# Patient Record
Sex: FEMALE | Race: WHITE | NOT HISPANIC OR LATINO | Employment: OTHER | URBAN - METROPOLITAN AREA
[De-identification: names, ages, dates, MRNs, and addresses within clinical notes are randomized per-mention and may not be internally consistent; named-entity substitution may affect disease eponyms.]

---

## 2021-12-27 ENCOUNTER — HOSPITAL ENCOUNTER (OUTPATIENT)
Facility: HOSPITAL | Age: 81
Setting detail: SURGERY ADMIT
End: 2021-12-27
Attending: ORTHOPAEDIC SURGERY | Admitting: ORTHOPAEDIC SURGERY

## 2022-01-04 ENCOUNTER — PRE-ADMISSION TESTING (OUTPATIENT)
Dept: PREADMISSION TESTING | Facility: HOSPITAL | Age: 82
End: 2022-01-04

## 2022-01-04 ENCOUNTER — HOSPITAL ENCOUNTER (OUTPATIENT)
Dept: GENERAL RADIOLOGY | Facility: HOSPITAL | Age: 82
Discharge: HOME | End: 2022-01-04

## 2022-01-04 VITALS
RESPIRATION RATE: 16 BRPM | HEIGHT: 61 IN | SYSTOLIC BLOOD PRESSURE: 171 MMHG | TEMPERATURE: 97.6 F | DIASTOLIC BLOOD PRESSURE: 74 MMHG | OXYGEN SATURATION: 99 % | HEART RATE: 68 BPM | BODY MASS INDEX: 23.64 KG/M2 | WEIGHT: 125.2 LBS

## 2022-01-04 LAB
ALBUMIN SERPL-MCNC: 4.6 G/DL (ref 3.5–5.2)
ALBUMIN/GLOB SERPL: 1.7 G/DL
ALP SERPL-CCNC: 50 U/L (ref 39–117)
ALT SERPL W P-5'-P-CCNC: 22 U/L (ref 1–33)
ANION GAP SERPL CALCULATED.3IONS-SCNC: 12.9 MMOL/L (ref 5–15)
APTT PPP: 26.2 SECONDS (ref 22.7–35.4)
AST SERPL-CCNC: 19 U/L (ref 1–32)
BACTERIA UR QL AUTO: ABNORMAL /HPF
BASOPHILS # BLD AUTO: 0.05 10*3/MM3 (ref 0–0.2)
BASOPHILS NFR BLD AUTO: 0.9 % (ref 0–1.5)
BILIRUB SERPL-MCNC: 0.4 MG/DL (ref 0–1.2)
BILIRUB UR QL STRIP: NEGATIVE
BUN SERPL-MCNC: 26 MG/DL (ref 8–23)
BUN/CREAT SERPL: 25.7 (ref 7–25)
CALCIUM SPEC-SCNC: 10.6 MG/DL (ref 8.6–10.5)
CHLORIDE SERPL-SCNC: 102 MMOL/L (ref 98–107)
CLARITY UR: CLEAR
CO2 SERPL-SCNC: 23.1 MMOL/L (ref 22–29)
COLOR UR: YELLOW
CREAT SERPL-MCNC: 1.01 MG/DL (ref 0.57–1)
DEPRECATED RDW RBC AUTO: 45.3 FL (ref 37–54)
EOSINOPHIL # BLD AUTO: 0.2 10*3/MM3 (ref 0–0.4)
EOSINOPHIL NFR BLD AUTO: 3.6 % (ref 0.3–6.2)
ERYTHROCYTE [DISTWIDTH] IN BLOOD BY AUTOMATED COUNT: 13.3 % (ref 12.3–15.4)
GFR SERPL CREATININE-BSD FRML MDRD: 52 ML/MIN/1.73
GLOBULIN UR ELPH-MCNC: 2.7 GM/DL
GLUCOSE SERPL-MCNC: 195 MG/DL (ref 65–99)
GLUCOSE UR STRIP-MCNC: ABNORMAL MG/DL
HBA1C MFR BLD: 9.41 % (ref 4.8–5.6)
HCT VFR BLD AUTO: 38.8 % (ref 34–46.6)
HGB BLD-MCNC: 13 G/DL (ref 12–15.9)
HGB UR QL STRIP.AUTO: NEGATIVE
HYALINE CASTS UR QL AUTO: ABNORMAL /LPF
IMM GRANULOCYTES # BLD AUTO: 0.02 10*3/MM3 (ref 0–0.05)
IMM GRANULOCYTES NFR BLD AUTO: 0.4 % (ref 0–0.5)
INR PPP: 1.17 (ref 0.9–1.1)
KETONES UR QL STRIP: NEGATIVE
LEUKOCYTE ESTERASE UR QL STRIP.AUTO: ABNORMAL
LYMPHOCYTES # BLD AUTO: 2.24 10*3/MM3 (ref 0.7–3.1)
LYMPHOCYTES NFR BLD AUTO: 40 % (ref 19.6–45.3)
MCH RBC QN AUTO: 31.3 PG (ref 26.6–33)
MCHC RBC AUTO-ENTMCNC: 33.5 G/DL (ref 31.5–35.7)
MCV RBC AUTO: 93.5 FL (ref 79–97)
MONOCYTES # BLD AUTO: 0.52 10*3/MM3 (ref 0.1–0.9)
MONOCYTES NFR BLD AUTO: 9.3 % (ref 5–12)
NEUTROPHILS NFR BLD AUTO: 2.57 10*3/MM3 (ref 1.7–7)
NEUTROPHILS NFR BLD AUTO: 45.8 % (ref 42.7–76)
NITRITE UR QL STRIP: NEGATIVE
NRBC BLD AUTO-RTO: 0 /100 WBC (ref 0–0.2)
PH UR STRIP.AUTO: <=5 [PH] (ref 5–8)
PLATELET # BLD AUTO: 178 10*3/MM3 (ref 140–450)
PMV BLD AUTO: 10 FL (ref 6–12)
POTASSIUM SERPL-SCNC: 3.9 MMOL/L (ref 3.5–5.2)
PROT SERPL-MCNC: 7.3 G/DL (ref 6–8.5)
PROT UR QL STRIP: NEGATIVE
PROTHROMBIN TIME: 14.8 SECONDS (ref 11.7–14.2)
QT INTERVAL: 402 MS
RBC # BLD AUTO: 4.15 10*6/MM3 (ref 3.77–5.28)
RBC # UR STRIP: ABNORMAL /HPF
REF LAB TEST METHOD: ABNORMAL
SODIUM SERPL-SCNC: 138 MMOL/L (ref 136–145)
SP GR UR STRIP: 1.02 (ref 1–1.03)
SQUAMOUS #/AREA URNS HPF: ABNORMAL /HPF
UROBILINOGEN UR QL STRIP: ABNORMAL
WBC # UR STRIP: ABNORMAL /HPF
WBC NRBC COR # BLD: 5.6 10*3/MM3 (ref 3.4–10.8)

## 2022-01-04 PROCEDURE — 85730 THROMBOPLASTIN TIME PARTIAL: CPT

## 2022-01-04 PROCEDURE — 73502 X-RAY EXAM HIP UNI 2-3 VIEWS: CPT

## 2022-01-04 PROCEDURE — 87086 URINE CULTURE/COLONY COUNT: CPT

## 2022-01-04 PROCEDURE — 36415 COLL VENOUS BLD VENIPUNCTURE: CPT

## 2022-01-04 PROCEDURE — 83036 HEMOGLOBIN GLYCOSYLATED A1C: CPT

## 2022-01-04 PROCEDURE — 85025 COMPLETE CBC W/AUTO DIFF WBC: CPT

## 2022-01-04 PROCEDURE — 81001 URINALYSIS AUTO W/SCOPE: CPT

## 2022-01-04 PROCEDURE — 80053 COMPREHEN METABOLIC PANEL: CPT

## 2022-01-04 PROCEDURE — 71046 X-RAY EXAM CHEST 2 VIEWS: CPT

## 2022-01-04 PROCEDURE — 93010 ELECTROCARDIOGRAM REPORT: CPT | Performed by: INTERNAL MEDICINE

## 2022-01-04 PROCEDURE — 93005 ELECTROCARDIOGRAM TRACING: CPT

## 2022-01-04 PROCEDURE — 85610 PROTHROMBIN TIME: CPT

## 2022-01-04 RX ORDER — CHLORHEXIDINE GLUCONATE 500 MG/1
CLOTH TOPICAL
COMMUNITY

## 2022-01-04 ASSESSMENT — HOOS JR
HOOS JR SCORE: 1
HOOS JR SCORE: 92.34

## 2022-01-04 NOTE — DISCHARGE INSTRUCTIONS
Take the following medications the morning of surgery: METOPROLOL, OMEPRAZOLE    ARRIVAL TIME : 900AM      General Instructions:  • Do not eat solid food after midnight the night before surgery.  • You may drink clear liquids day of surgery but must stop at least one hour before your hospital arrival time. 800AM  • It is beneficial for you to have a clear drink that contains carbohydrates the day of surgery.  We suggest a 12 to 20 ounce bottle of Gatorade or Powerade for non-diabetic patients or a 12 to 20 ounce bottle of G2 or Powerade Zero for diabetic patients. (Pediatric patients, are not advised to drink a 12 to 20 ounce carbohydrate drink)    Clear liquids are liquids you can see through.  Nothing red in color.     Plain water                               Sports drinks  Sodas                                   Gelatin (Jell-O)  Fruit juices without pulp such as white grape juice and apple juice  Popsicles that contain no fruit or yogurt  Tea or coffee (no cream or milk added)  Gatorade / Powerade  G2 / Powerade Zero    • Patients who avoid smoking, chewing tobacco and alcohol for 4 weeks prior to surgery have a reduced risk of post-operative complications.  Quit smoking as many days before surgery as you can.  • Do not smoke, use chewing tobacco or drink alcohol the day of surgery.   • Bring any papers given to you in the doctor’s office.  • Wear clean comfortable clothes.  • Do not wear contact lenses, false eyelasheS or make-up.  Bring a case for your glasses.   • Remove all piercings.  Leave jewelry and any other valuables at home.  • The Pre-Admission Testing nurse will instruct you to bring medications if unable to obtain an accurate list in Pre-Admission Testing.      Preventing a Surgical Site Infection:  • For 2 to 3 days before surgery, avoid shaving with a razor because the razor can irritate skin and make it easier to develop an infection.    • Any areas of open skin can increase the risk of a  post-operative wound infection by allowing bacteria to enter and travel throughout the body.  Notify your surgeon if you have any skin wounds / rashes even if it is not near the expected surgical site.  The area will need assessed to determine if surgery should be delayed until it is healed.  • The night prior to surgery shower using a fresh bar of anti-bacterial soap (such as Dial) and clean washcloth.  Sleep in a clean bed with clean clothing.  Do not allow pets to sleep with you.  • Shower on the morning of surgery using a fresh bar of anti-bacterial soap (such as Dial) and clean washcloth.  Dry with a clean towel and dress in clean clothing.  • Ask your surgeon if you will be receiving antibiotics prior to surgery.  • Make sure you, your family, and all healthcare providers clean their hands with soap and water or an alcohol based hand  before caring for you or your wound.    Day of surgery:  Your arrival time is approximately two hours before your scheduled surgery time.  Upon arrival, a Pre-op nurse and Anesthesiologist will review your health history, obtain vital signs, and answer questions you may have.  The only belongings needed at this time will be a list of your home medications and if applicable your C-PAP/BI-PAP machine.  A Pre-op nurse will start an IV and you may receive medication in preparation for surgery, including something to help you relax.     Please be aware that surgery does come with discomfort.  We want to make every effort to control your discomfort so please discuss any uncontrolled symptoms with your nurse.   Your doctor will most likely have prescribed pain medications.      If you are going home after surgery you will receive individualized written care instructions before being discharged.  A responsible adult must drive you to and from the hospital on the day of your surgery and stay with you for 24 hours.  Discharge prescriptions can be filled by the hospital pharmacy  during regular pharmacy hours.  If you are having surgery late in the day/evening your prescription may be e-prescribed to your pharmacy.  Please verify your pharmacy hours or chose a 24 hour pharmacy to avoid not having access to your prescription because your pharmacy has closed for the day.    If you are staying overnight following surgery, you will be transported to your hospital room following the recovery period.  [facility] has all private rooms.    If you have any questions please call Pre-Admission Testing at [phone].  Deductibles and co-payments are collected on the day of service. Please be prepared to pay the required co-pay, deductible or deposit on the day of service as defined by your plan.    Patient Education for Self-Quarantine Process    • Following your COVID testing, we strongly recommend that you wear a mask when you are with other people and practice social distancing.   • Limit your activities to only required outings.  • Wash your hands with soap and water frequently for at least 20 seconds.   • Avoid touching your eyes, nose and mouth with unwashed hands.  • Do not share anything - utensils, drinking glasses, food from the same bowl.   • Sanitize household surfaces daily. Include all high touch areas (door handles, light switches, phones, countertops, etc.)    Call your surgeon immediately if you experience any of the following symptoms:  • Sore Throat  • Shortness of Breath or difficulty breathing  • Cough  • Chills  • Body soreness or muscle pain  • Headache  • Fever  • New loss of taste or smell  • Do not arrive for your surgery ill.  Your procedure will need to be rescheduled to another time.  You will need to call your physician before the day of surgery to avoid any unnecessary exposure to hospital staff as well as other patients.      CHLORHEXIDINE CLOTH INSTRUCTIONS  The morning of surgery follow these instructions using the Chlorhexidine cloths you've been  given.  These steps reduce bacteria on the body.  Do not use the cloths near your eyes, ears mouth, genitalia or on open wounds.  Throw the cloths away after use but do not try to flush them down a toilet.      • Open and remove one cloth at a time from the package.    • Leave the cloth unfolded and begin the bathing.  • Massage the skin with the cloths using gentle pressure to remove bacteria.  Do not scrub harshly.   • Follow the steps below with one 2% CHG cloth per area (6 total cloths).  • One cloth for neck, shoulders and chest.  • One cloth for both arms, hands, fingers and underarms (do underarms last).  • One cloth for the abdomen followed by groin.  • One cloth for right leg and foot including between the toes.  • One cloth for left leg and foot including between the toes.  • The last cloth is to be used for the back of the neck, back and buttocks.    Allow the CHG to air dry 3 minutes on the skin which will give it time to work and decrease the chance of irritation.  The skin may feel sticky until it is dry.  Do not rinse with water or any other liquid or you will lose the beneficial effects of the CHG.  If mild skin irritation occurs, do rinse the skin to remove the CHG.  Report this to the nurse at time of admission.  Do not apply lotions, creams, ointments, deodorants or perfumes after using the clothes. Dress in clean clothes before coming to the hospital.    BACTROBAN NASAL OINTMENT  There are many germs normally in your nose. Bactroban is an ointment that will help reduce these germs. Please follow these instructions for Bactroban use:      __1__The day before surgery in the morning  Date__1/10/22    __2__The day before surgery in the evening              Date__1/10/22    __3__The day of surgery in the morning    Date__1/11/22    **Squirt ½ package of Bactroban Ointment onto a cotton applicator and apply to inside of 1st nostril.  Squirt the remaining Bactroban and apply to the inside of the other  nostril.

## 2022-01-05 LAB — BACTERIA SPEC AEROBE CULT: NORMAL

## 2022-03-04 ENCOUNTER — PRE-ADMISSION TESTING (OUTPATIENT)
Dept: PREADMISSION TESTING | Facility: HOSPITAL | Age: 82
End: 2022-03-04

## 2022-03-04 VITALS
HEIGHT: 61 IN | DIASTOLIC BLOOD PRESSURE: 74 MMHG | HEART RATE: 68 BPM | WEIGHT: 120.1 LBS | OXYGEN SATURATION: 98 % | RESPIRATION RATE: 16 BRPM | TEMPERATURE: 98.3 F | BODY MASS INDEX: 22.68 KG/M2 | SYSTOLIC BLOOD PRESSURE: 174 MMHG

## 2022-03-04 LAB
ALBUMIN SERPL-MCNC: 4.6 G/DL (ref 3.5–5.2)
ALBUMIN/GLOB SERPL: 1.6 G/DL
ALP SERPL-CCNC: 64 U/L (ref 39–117)
ALT SERPL W P-5'-P-CCNC: 17 U/L (ref 1–33)
ANION GAP SERPL CALCULATED.3IONS-SCNC: 12 MMOL/L (ref 5–15)
APTT PPP: 29.5 SECONDS (ref 22.7–35.4)
AST SERPL-CCNC: 19 U/L (ref 1–32)
BACTERIA UR QL AUTO: NORMAL /HPF
BASOPHILS # BLD AUTO: 0.06 10*3/MM3 (ref 0–0.2)
BASOPHILS NFR BLD AUTO: 1.1 % (ref 0–1.5)
BILIRUB SERPL-MCNC: 0.3 MG/DL (ref 0–1.2)
BILIRUB UR QL STRIP: NEGATIVE
BUN SERPL-MCNC: 29 MG/DL (ref 8–23)
BUN/CREAT SERPL: 23.8 (ref 7–25)
CALCIUM SPEC-SCNC: 10.4 MG/DL (ref 8.6–10.5)
CHLORIDE SERPL-SCNC: 104 MMOL/L (ref 98–107)
CLARITY UR: CLEAR
CO2 SERPL-SCNC: 25 MMOL/L (ref 22–29)
COD CRY URNS QL: NORMAL /HPF
COLOR UR: YELLOW
CREAT SERPL-MCNC: 1.22 MG/DL (ref 0.57–1)
DEPRECATED RDW RBC AUTO: 45.8 FL (ref 37–54)
EGFRCR SERPLBLD CKD-EPI 2021: 44.4 ML/MIN/1.73
EOSINOPHIL # BLD AUTO: 0.31 10*3/MM3 (ref 0–0.4)
EOSINOPHIL NFR BLD AUTO: 5.6 % (ref 0.3–6.2)
ERYTHROCYTE [DISTWIDTH] IN BLOOD BY AUTOMATED COUNT: 13 % (ref 12.3–15.4)
GLOBULIN UR ELPH-MCNC: 2.9 GM/DL
GLUCOSE SERPL-MCNC: 135 MG/DL (ref 65–99)
GLUCOSE UR STRIP-MCNC: ABNORMAL MG/DL
HBA1C MFR BLD: 7.1 % (ref 4.8–5.6)
HCT VFR BLD AUTO: 35.5 % (ref 34–46.6)
HGB BLD-MCNC: 11.5 G/DL (ref 12–15.9)
HGB UR QL STRIP.AUTO: NEGATIVE
HYALINE CASTS UR QL AUTO: NORMAL /LPF
IMM GRANULOCYTES # BLD AUTO: 0.01 10*3/MM3 (ref 0–0.05)
IMM GRANULOCYTES NFR BLD AUTO: 0.2 % (ref 0–0.5)
INR PPP: 1.11 (ref 0.9–1.1)
KETONES UR QL STRIP: NEGATIVE
LEUKOCYTE ESTERASE UR QL STRIP.AUTO: ABNORMAL
LYMPHOCYTES # BLD AUTO: 1.86 10*3/MM3 (ref 0.7–3.1)
LYMPHOCYTES NFR BLD AUTO: 33.9 % (ref 19.6–45.3)
MCH RBC QN AUTO: 30.8 PG (ref 26.6–33)
MCHC RBC AUTO-ENTMCNC: 32.4 G/DL (ref 31.5–35.7)
MCV RBC AUTO: 95.2 FL (ref 79–97)
MONOCYTES # BLD AUTO: 0.52 10*3/MM3 (ref 0.1–0.9)
MONOCYTES NFR BLD AUTO: 9.5 % (ref 5–12)
NEUTROPHILS NFR BLD AUTO: 2.73 10*3/MM3 (ref 1.7–7)
NEUTROPHILS NFR BLD AUTO: 49.7 % (ref 42.7–76)
NITRITE UR QL STRIP: NEGATIVE
NRBC BLD AUTO-RTO: 0 /100 WBC (ref 0–0.2)
PH UR STRIP.AUTO: <=5 [PH] (ref 5–8)
PLATELET # BLD AUTO: 185 10*3/MM3 (ref 140–450)
PMV BLD AUTO: 10.1 FL (ref 6–12)
POTASSIUM SERPL-SCNC: 3.9 MMOL/L (ref 3.5–5.2)
PROT SERPL-MCNC: 7.5 G/DL (ref 6–8.5)
PROT UR QL STRIP: NEGATIVE
PROTHROMBIN TIME: 14.2 SECONDS (ref 11.7–14.2)
RBC # BLD AUTO: 3.73 10*6/MM3 (ref 3.77–5.28)
RBC # UR STRIP: NORMAL /HPF
REF LAB TEST METHOD: NORMAL
SARS-COV-2 ORF1AB RESP QL NAA+PROBE: NOT DETECTED
SODIUM SERPL-SCNC: 141 MMOL/L (ref 136–145)
SP GR UR STRIP: 1.02 (ref 1–1.03)
SQUAMOUS #/AREA URNS HPF: NORMAL /HPF
UROBILINOGEN UR QL STRIP: ABNORMAL
WBC # UR STRIP: NORMAL /HPF
WBC NRBC COR # BLD: 5.49 10*3/MM3 (ref 3.4–10.8)

## 2022-03-04 PROCEDURE — U0005 INFEC AGEN DETEC AMPLI PROBE: HCPCS

## 2022-03-04 PROCEDURE — 85730 THROMBOPLASTIN TIME PARTIAL: CPT

## 2022-03-04 PROCEDURE — 80053 COMPREHEN METABOLIC PANEL: CPT

## 2022-03-04 PROCEDURE — 83036 HEMOGLOBIN GLYCOSYLATED A1C: CPT

## 2022-03-04 PROCEDURE — U0004 COV-19 TEST NON-CDC HGH THRU: HCPCS

## 2022-03-04 PROCEDURE — 85610 PROTHROMBIN TIME: CPT

## 2022-03-04 PROCEDURE — 81001 URINALYSIS AUTO W/SCOPE: CPT

## 2022-03-04 PROCEDURE — C9803 HOPD COVID-19 SPEC COLLECT: HCPCS

## 2022-03-04 PROCEDURE — 36415 COLL VENOUS BLD VENIPUNCTURE: CPT

## 2022-03-04 PROCEDURE — 85025 COMPLETE CBC W/AUTO DIFF WBC: CPT

## 2022-03-04 RX ORDER — DIAZEPAM 5 MG/1
5 TABLET ORAL 2 TIMES DAILY PRN
COMMUNITY

## 2022-03-04 RX ORDER — METOPROLOL SUCCINATE 100 MG/1
1 TABLET, EXTENDED RELEASE ORAL DAILY
COMMUNITY
Start: 2022-02-08

## 2022-03-04 RX ORDER — ZOLPIDEM TARTRATE 10 MG/1
1 TABLET ORAL NIGHTLY
COMMUNITY
Start: 2022-01-21

## 2022-03-04 NOTE — DISCHARGE INSTRUCTIONS
Arrive to hospital on your day of surgery at 7AM    CALL AND VERIFY ARRIVAL TIME    Take the following medications the morning of surgery:  OMEPRAZOLE, METOPROLOL      If you are on prescription narcotic pain medication to control your pain you may also take that medication the morning of surgery.    General Instructions:  • Do not eat solid food after midnight the night before surgery.  • You may drink clear liquids day of surgery but must stop at least one hour before your hospital arrival time.  • It is beneficial for you to have a clear drink that contains carbohydrates the day of surgery.  We suggest a 12 to 20 ounce bottle of Gatorade or Powerade for non-diabetic patients or a 12 to 20 ounce bottle of G2 or Powerade Zero for diabetic patients. (Pediatric patients, are not advised to drink a 12 to 20 ounce carbohydrate drink)    Clear liquids are liquids you can see through.  Nothing red in color.     Plain water                               Sports drinks  Sodas                                   Gelatin (Jell-O)  Fruit juices without pulp such as white grape juice and apple juice  Popsicles that contain no fruit or yogurt  Tea or coffee (no cream or milk added)  Gatorade / Powerade  G2 / Powerade Zero    • Infants may have breast milk up to four hours before surgery.  • Infants drinking formula may drink formula up to six hours before surgery.   • Patients who avoid smoking, chewing tobacco and alcohol for 4 weeks prior to surgery have a reduced risk of post-operative complications.  Quit smoking as many days before surgery as you can.  • Do not smoke, use chewing tobacco or drink alcohol the day of surgery.   • If applicable bring your C-PAP/ BI-PAP machine.  • Bring any papers given to you in the doctor’s office.  • Wear clean comfortable clothes.  • Do not wear contact lenses, false eyelashes or make-up.  Bring a case for your glasses.   • Bring crutches or walker if applicable.  • Remove all piercings.   Leave jewelry and any other valuables at home.  • Hair extensions with metal clips must be removed prior to surgery.  • The Pre-Admission Testing nurse will instruct you to bring medications if unable to obtain an accurate list in Pre-Admission Testing.        If you were given a blood bank ID arm band remember to bring it with you the day of surgery.    Preventing a Surgical Site Infection:  • For 2 to 3 days before surgery, avoid shaving with a razor because the razor can irritate skin and make it easier to develop an infection.    • Any areas of open skin can increase the risk of a post-operative wound infection by allowing bacteria to enter and travel throughout the body.  Notify your surgeon if you have any skin wounds / rashes even if it is not near the expected surgical site.  The area will need assessed to determine if surgery should be delayed until it is healed.  • The night prior to surgery shower using a fresh bar of anti-bacterial soap (such as Dial) and clean washcloth.  Sleep in a clean bed with clean clothing.  Do not allow pets to sleep with you.  • Shower on the morning of surgery using a fresh bar of anti-bacterial soap (such as Dial) and clean washcloth.  Dry with a clean towel and dress in clean clothing.  • Ask your surgeon if you will be receiving antibiotics prior to surgery.  • Make sure you, your family, and all healthcare providers clean their hands with soap and water or an alcohol based hand  before caring for you or your wound.    Day of surgery:  Your arrival time is approximately two hours before your scheduled surgery time.  Upon arrival, a Pre-op nurse and Anesthesiologist will review your health history, obtain vital signs, and answer questions you may have.  The only belongings needed at this time will be a list of your home medications and if applicable your C-PAP/BI-PAP machine.  A Pre-op nurse will start an IV and you may receive medication in preparation for surgery,  including something to help you relax.     Please be aware that surgery does come with discomfort.  We want to make every effort to control your discomfort so please discuss any uncontrolled symptoms with your nurse.   Your doctor will most likely have prescribed pain medications.      If you are going home after surgery you will receive individualized written care instructions before being discharged.  A responsible adult must drive you to and from the hospital on the day of your surgery and stay with you for 24 hours.  Discharge prescriptions can be filled by the hospital pharmacy during regular pharmacy hours.  If you are having surgery late in the day/evening your prescription may be e-prescribed to your pharmacy.  Please verify your pharmacy hours or chose a 24 hour pharmacy to avoid not having access to your prescription because your pharmacy has closed for the day.    If you are staying overnight following surgery, you will be transported to your hospital room following the recovery period.  [facility] has all private rooms.    If you have any questions please call Pre-Admission Testing at [phone].  Deductibles and co-payments are collected on the day of service. Please be prepared to pay the required co-pay, deductible or deposit on the day of service as defined by your plan.    Patient Education for Self-Quarantine Process    • Following your COVID testing, we strongly recommend that you wear a mask when you are with other people and practice social distancing.   • Limit your activities to only required outings.  • Wash your hands with soap and water frequently for at least 20 seconds.   • Avoid touching your eyes, nose and mouth with unwashed hands.  • Do not share anything - utensils, drinking glasses, food from the same bowl.   • Sanitize household surfaces daily. Include all high touch areas (door handles, light switches, phones, countertops, etc.)    Call your surgeon immediately  if you experience any of the following symptoms:  • Sore Throat  • Shortness of Breath or difficulty breathing  • Cough  • Chills  • Body soreness or muscle pain  • Headache  • Fever  • New loss of taste or smell  • Do not arrive for your surgery ill.  Your procedure will need to be rescheduled to another time.  You will need to call your physician before the day of surgery to avoid any unnecessary exposure to hospital staff as well as other patients  •   CHLORHEXIDINE CLOTH INSTRUCTIONS  The morning of surgery follow these instructions using the Chlorhexidine cloths you've been given.  These steps reduce bacteria on the body.  Do not use the cloths near your eyes, ears mouth, genitalia or on open wounds.  Throw the cloths away after use but do not try to flush them down a toilet.      Open and remove one cloth at a time from the package.    Leave the cloth unfolded and begin the bathing.  Massage the skin with the cloths using gentle pressure to remove bacteria.  Do not scrub harshly.   Follow the steps below with one 2% CHG cloth per area (6 total cloths).  One cloth for neck, shoulders and chest.  One cloth for both arms, hands, fingers and underarms (do underarms last).  One cloth for the abdomen followed by groin.  One cloth for right leg and foot including between the toes.  One cloth for left leg and foot including between the toes.  The last cloth is to be used for the back of the neck, back and buttocks.    Allow the CHG to air dry 3 minutes on the skin which will give it time to work and decrease the chance of irritation.  The skin may feel sticky until it is dry.  Do not rinse with water or any other liquid or you will lose the beneficial effects of the CHG.  If mild skin irritation occurs, do rinse the skin to remove the CHG.  Report this to the nurse at time of admission.  Do not apply lotions, creams, ointments, deodorants or perfumes after using the clothes. Dress in clean clothes before coming to the  hospitals.    BACTROBAN NASAL OINTMENT  There are many germs normally in your nose. Bactroban is an ointment that will help reduce these germs. Please follow these instructions for Bactroban use:      ____The day before surgery in the morning  Date________    ____The day before surgery in the evening              Date________    ____The day of surgery in the morning    Date________    **Squirt ½ package of Bactroban Ointment onto a cotton applicator and apply to inside of 1st nostril.  Squirt the remaining Bactroban and apply to the inside of the other nostril..

## 2022-03-07 ENCOUNTER — ANESTHESIA EVENT (OUTPATIENT)
Dept: PERIOP | Facility: HOSPITAL | Age: 82
End: 2022-03-07

## 2022-03-07 ENCOUNTER — HOSPITAL ENCOUNTER (INPATIENT)
Facility: HOSPITAL | Age: 82
LOS: 1 days | Discharge: HOME HEALTH | End: 2022-03-09
Attending: ORTHOPAEDIC SURGERY | Admitting: ORTHOPAEDIC SURGERY

## 2022-03-07 ENCOUNTER — APPOINTMENT (OUTPATIENT)
Dept: GENERAL RADIOLOGY | Facility: HOSPITAL | Age: 82
End: 2022-03-07

## 2022-03-07 ENCOUNTER — ANESTHESIA (OUTPATIENT)
Dept: PERIOP | Facility: HOSPITAL | Age: 82
End: 2022-03-07

## 2022-03-07 PROBLEM — Z96.649 HIP JOINT REPLACEMENT STATUS: Status: ACTIVE | Noted: 2022-03-07

## 2022-03-07 LAB
GLUCOSE BLDC GLUCOMTR-MCNC: 154 MG/DL (ref 70–130)
GLUCOSE BLDC GLUCOMTR-MCNC: 193 MG/DL (ref 70–130)
GLUCOSE BLDC GLUCOMTR-MCNC: 207 MG/DL (ref 70–130)
GLUCOSE BLDC GLUCOMTR-MCNC: 212 MG/DL (ref 70–130)

## 2022-03-07 PROCEDURE — 25010000002 EPINEPHRINE 1 MG/ML SOLUTION 30 ML VIAL: Performed by: ORTHOPAEDIC SURGERY

## 2022-03-07 PROCEDURE — 25010000002 CEFAZOLIN IN DEXTROSE 2-4 GM/100ML-% SOLUTION: Performed by: ORTHOPAEDIC SURGERY

## 2022-03-07 PROCEDURE — 25010000002 ONDANSETRON PER 1 MG: Performed by: NURSE ANESTHETIST, CERTIFIED REGISTERED

## 2022-03-07 PROCEDURE — C1889 IMPLANT/INSERT DEVICE, NOC: HCPCS | Performed by: ORTHOPAEDIC SURGERY

## 2022-03-07 PROCEDURE — G0378 HOSPITAL OBSERVATION PER HR: HCPCS

## 2022-03-07 PROCEDURE — 25010000002 ONDANSETRON PER 1 MG: Performed by: ORTHOPAEDIC SURGERY

## 2022-03-07 PROCEDURE — 97162 PT EVAL MOD COMPLEX 30 MIN: CPT

## 2022-03-07 PROCEDURE — 97530 THERAPEUTIC ACTIVITIES: CPT

## 2022-03-07 PROCEDURE — 25010000002 CLONIDINE PER 1 MG: Performed by: ORTHOPAEDIC SURGERY

## 2022-03-07 PROCEDURE — 0SRB06A REPLACEMENT OF LEFT HIP JOINT WITH OXIDIZED ZIRCONIUM ON POLYETHYLENE SYNTHETIC SUBSTITUTE, UNCEMENTED, OPEN APPROACH: ICD-10-PCS | Performed by: ORTHOPAEDIC SURGERY

## 2022-03-07 PROCEDURE — 25010000002 HYDROMORPHONE PER 4 MG: Performed by: NURSE ANESTHETIST, CERTIFIED REGISTERED

## 2022-03-07 PROCEDURE — 25010000002 FENTANYL CITRATE (PF) 50 MCG/ML SOLUTION: Performed by: NURSE ANESTHETIST, CERTIFIED REGISTERED

## 2022-03-07 PROCEDURE — C1776 JOINT DEVICE (IMPLANTABLE): HCPCS | Performed by: ORTHOPAEDIC SURGERY

## 2022-03-07 PROCEDURE — 25010000002 ROPIVACAINE PER 1 MG: Performed by: ORTHOPAEDIC SURGERY

## 2022-03-07 PROCEDURE — 25010000002 PROPOFOL 10 MG/ML EMULSION: Performed by: NURSE ANESTHETIST, CERTIFIED REGISTERED

## 2022-03-07 PROCEDURE — 25010000002 NEOSTIGMINE 5 MG/10ML SOLUTION: Performed by: NURSE ANESTHETIST, CERTIFIED REGISTERED

## 2022-03-07 PROCEDURE — 76000 FLUOROSCOPY <1 HR PHYS/QHP: CPT

## 2022-03-07 PROCEDURE — 72170 X-RAY EXAM OF PELVIS: CPT

## 2022-03-07 PROCEDURE — 82962 GLUCOSE BLOOD TEST: CPT

## 2022-03-07 PROCEDURE — 73501 X-RAY EXAM HIP UNI 1 VIEW: CPT

## 2022-03-07 PROCEDURE — 25010000002 DEXAMETHASONE PER 1 MG: Performed by: NURSE ANESTHETIST, CERTIFIED REGISTERED

## 2022-03-07 PROCEDURE — 25010000002 KETOROLAC TROMETHAMINE PER 15 MG: Performed by: ORTHOPAEDIC SURGERY

## 2022-03-07 DEVICE — KNOTLESS TISSUE CONTROL DEVICE, VIOLET UNIDIRECTIONAL (ANTIBACTERIAL) SYNTHETIC ABSORBABLE DEVICE
Type: IMPLANTABLE DEVICE | Site: HIP | Status: FUNCTIONAL
Brand: STRATAFIX

## 2022-03-07 DEVICE — OR3O DUAL MOBILITY LINER 36/48
Type: IMPLANTABLE DEVICE | Site: HIP | Status: FUNCTIONAL
Brand: OR3O DUAL MOBILITY

## 2022-03-07 DEVICE — IMPLANTABLE DEVICE: Type: IMPLANTABLE DEVICE | Site: HIP | Status: FUNCTIONAL

## 2022-03-07 DEVICE — POLARSTEM STANDARD NON-CEMENTED                                    WITH TI/HA 0
Type: IMPLANTABLE DEVICE | Site: HIP | Status: FUNCTIONAL
Brand: POLARSTEM

## 2022-03-07 DEVICE — OXINIUM FEMORAL HEAD 12/14 TAPER                                    22 MM +0
Type: IMPLANTABLE DEVICE | Site: HIP | Status: FUNCTIONAL
Brand: OXINIUM

## 2022-03-07 DEVICE — R3 3 HOLE ACETABULAR SHELL 48MM
Type: IMPLANTABLE DEVICE | Site: HIP | Status: FUNCTIONAL
Brand: R3 ACETABULAR

## 2022-03-07 DEVICE — OR3O DUAL MOBILITY XLPE INSERT 22/36
Type: IMPLANTABLE DEVICE | Site: HIP | Status: FUNCTIONAL
Brand: OR3O DUAL MOBILITY

## 2022-03-07 RX ORDER — IBUPROFEN 600 MG/1
600 TABLET ORAL ONCE AS NEEDED
Status: DISCONTINUED | OUTPATIENT
Start: 2022-03-07 | End: 2022-03-07 | Stop reason: HOSPADM

## 2022-03-07 RX ORDER — FENTANYL CITRATE 50 UG/ML
INJECTION, SOLUTION INTRAMUSCULAR; INTRAVENOUS AS NEEDED
Status: DISCONTINUED | OUTPATIENT
Start: 2022-03-07 | End: 2022-03-07 | Stop reason: SURG

## 2022-03-07 RX ORDER — CLINDAMYCIN PHOSPHATE 900 MG/50ML
900 INJECTION INTRAVENOUS ONCE
Status: COMPLETED | OUTPATIENT
Start: 2022-03-07 | End: 2022-03-07

## 2022-03-07 RX ORDER — ASPIRIN 81 MG/1
81 TABLET ORAL EVERY 12 HOURS SCHEDULED
Status: DISCONTINUED | OUTPATIENT
Start: 2022-03-07 | End: 2022-03-09 | Stop reason: HOSPADM

## 2022-03-07 RX ORDER — OXYCODONE AND ACETAMINOPHEN 7.5; 325 MG/1; MG/1
1 TABLET ORAL EVERY 4 HOURS PRN
Status: DISCONTINUED | OUTPATIENT
Start: 2022-03-07 | End: 2022-03-07 | Stop reason: HOSPADM

## 2022-03-07 RX ORDER — HYDROCODONE BITARTRATE AND ACETAMINOPHEN 7.5; 325 MG/1; MG/1
1 TABLET ORAL ONCE AS NEEDED
Status: DISCONTINUED | OUTPATIENT
Start: 2022-03-07 | End: 2022-03-07 | Stop reason: HOSPADM

## 2022-03-07 RX ORDER — DIPHENHYDRAMINE HYDROCHLORIDE 50 MG/ML
12.5 INJECTION INTRAMUSCULAR; INTRAVENOUS
Status: DISCONTINUED | OUTPATIENT
Start: 2022-03-07 | End: 2022-03-07 | Stop reason: HOSPADM

## 2022-03-07 RX ORDER — PROMETHAZINE HYDROCHLORIDE 25 MG/1
25 SUPPOSITORY RECTAL ONCE AS NEEDED
Status: COMPLETED | OUTPATIENT
Start: 2022-03-07 | End: 2022-03-07

## 2022-03-07 RX ORDER — FAMOTIDINE 20 MG/1
40 TABLET, FILM COATED ORAL DAILY
Status: DISCONTINUED | OUTPATIENT
Start: 2022-03-07 | End: 2022-03-09 | Stop reason: HOSPADM

## 2022-03-07 RX ORDER — PROMETHAZINE HYDROCHLORIDE 25 MG/1
25 TABLET ORAL ONCE AS NEEDED
Status: COMPLETED | OUTPATIENT
Start: 2022-03-07 | End: 2022-03-07

## 2022-03-07 RX ORDER — NEOSTIGMINE METHYLSULFATE 0.5 MG/ML
INJECTION, SOLUTION INTRAVENOUS AS NEEDED
Status: DISCONTINUED | OUTPATIENT
Start: 2022-03-07 | End: 2022-03-07 | Stop reason: SURG

## 2022-03-07 RX ORDER — FAMOTIDINE 10 MG/ML
20 INJECTION, SOLUTION INTRAVENOUS ONCE
Status: COMPLETED | OUTPATIENT
Start: 2022-03-07 | End: 2022-03-07

## 2022-03-07 RX ORDER — ROCURONIUM BROMIDE 10 MG/ML
INJECTION, SOLUTION INTRAVENOUS AS NEEDED
Status: DISCONTINUED | OUTPATIENT
Start: 2022-03-07 | End: 2022-03-07 | Stop reason: SURG

## 2022-03-07 RX ORDER — NICOTINE POLACRILEX 4 MG
15 LOZENGE BUCCAL
Status: DISCONTINUED | OUTPATIENT
Start: 2022-03-07 | End: 2022-03-09 | Stop reason: HOSPADM

## 2022-03-07 RX ORDER — ONDANSETRON 2 MG/ML
INJECTION INTRAMUSCULAR; INTRAVENOUS AS NEEDED
Status: DISCONTINUED | OUTPATIENT
Start: 2022-03-07 | End: 2022-03-07 | Stop reason: SURG

## 2022-03-07 RX ORDER — CEFAZOLIN SODIUM 2 G/100ML
2 INJECTION, SOLUTION INTRAVENOUS EVERY 8 HOURS
Status: COMPLETED | OUTPATIENT
Start: 2022-03-07 | End: 2022-03-08

## 2022-03-07 RX ORDER — FENTANYL CITRATE 50 UG/ML
50 INJECTION, SOLUTION INTRAMUSCULAR; INTRAVENOUS
Status: DISCONTINUED | OUTPATIENT
Start: 2022-03-07 | End: 2022-03-07 | Stop reason: HOSPADM

## 2022-03-07 RX ORDER — NALOXONE HCL 0.4 MG/ML
0.2 VIAL (ML) INJECTION AS NEEDED
Status: DISCONTINUED | OUTPATIENT
Start: 2022-03-07 | End: 2022-03-07 | Stop reason: HOSPADM

## 2022-03-07 RX ORDER — OXYCODONE HYDROCHLORIDE 5 MG/1
5 TABLET ORAL ONCE
Status: COMPLETED | OUTPATIENT
Start: 2022-03-07 | End: 2022-03-07

## 2022-03-07 RX ORDER — ACETAMINOPHEN 500 MG
1000 TABLET ORAL ONCE
Status: COMPLETED | OUTPATIENT
Start: 2022-03-07 | End: 2022-03-07

## 2022-03-07 RX ORDER — DEXAMETHASONE SODIUM PHOSPHATE 10 MG/ML
INJECTION INTRAMUSCULAR; INTRAVENOUS AS NEEDED
Status: DISCONTINUED | OUTPATIENT
Start: 2022-03-07 | End: 2022-03-07 | Stop reason: SURG

## 2022-03-07 RX ORDER — GLYCOPYRROLATE 0.2 MG/ML
INJECTION INTRAMUSCULAR; INTRAVENOUS AS NEEDED
Status: DISCONTINUED | OUTPATIENT
Start: 2022-03-07 | End: 2022-03-07 | Stop reason: SURG

## 2022-03-07 RX ORDER — LABETALOL HYDROCHLORIDE 5 MG/ML
5 INJECTION, SOLUTION INTRAVENOUS
Status: DISCONTINUED | OUTPATIENT
Start: 2022-03-07 | End: 2022-03-07 | Stop reason: HOSPADM

## 2022-03-07 RX ORDER — LIDOCAINE HYDROCHLORIDE 10 MG/ML
0.5 INJECTION, SOLUTION EPIDURAL; INFILTRATION; INTRACAUDAL; PERINEURAL ONCE AS NEEDED
Status: DISCONTINUED | OUTPATIENT
Start: 2022-03-07 | End: 2022-03-07 | Stop reason: HOSPADM

## 2022-03-07 RX ORDER — SODIUM CHLORIDE, SODIUM LACTATE, POTASSIUM CHLORIDE, CALCIUM CHLORIDE 600; 310; 30; 20 MG/100ML; MG/100ML; MG/100ML; MG/100ML
9 INJECTION, SOLUTION INTRAVENOUS CONTINUOUS
Status: DISCONTINUED | OUTPATIENT
Start: 2022-03-07 | End: 2022-03-09 | Stop reason: HOSPADM

## 2022-03-07 RX ORDER — ONDANSETRON 2 MG/ML
4 INJECTION INTRAMUSCULAR; INTRAVENOUS EVERY 6 HOURS PRN
Status: DISCONTINUED | OUTPATIENT
Start: 2022-03-07 | End: 2022-03-09 | Stop reason: HOSPADM

## 2022-03-07 RX ORDER — DIAZEPAM 5 MG/1
5 TABLET ORAL 2 TIMES DAILY PRN
Status: DISCONTINUED | OUTPATIENT
Start: 2022-03-07 | End: 2022-03-09 | Stop reason: HOSPADM

## 2022-03-07 RX ORDER — MELOXICAM 15 MG/1
15 TABLET ORAL DAILY
Status: DISCONTINUED | OUTPATIENT
Start: 2022-03-07 | End: 2022-03-09 | Stop reason: HOSPADM

## 2022-03-07 RX ORDER — PROPOFOL 10 MG/ML
VIAL (ML) INTRAVENOUS AS NEEDED
Status: DISCONTINUED | OUTPATIENT
Start: 2022-03-07 | End: 2022-03-07 | Stop reason: SURG

## 2022-03-07 RX ORDER — TRIAMTERENE AND HYDROCHLOROTHIAZIDE 37.5; 25 MG/1; MG/1
1 TABLET ORAL DAILY
Status: DISCONTINUED | OUTPATIENT
Start: 2022-03-07 | End: 2022-03-08

## 2022-03-07 RX ORDER — INSULIN LISPRO 100 [IU]/ML
0-9 INJECTION, SOLUTION INTRAVENOUS; SUBCUTANEOUS
Status: DISCONTINUED | OUTPATIENT
Start: 2022-03-07 | End: 2022-03-09 | Stop reason: HOSPADM

## 2022-03-07 RX ORDER — HYDROMORPHONE HYDROCHLORIDE 1 MG/ML
0.5 INJECTION, SOLUTION INTRAMUSCULAR; INTRAVENOUS; SUBCUTANEOUS
Status: DISCONTINUED | OUTPATIENT
Start: 2022-03-07 | End: 2022-03-07 | Stop reason: HOSPADM

## 2022-03-07 RX ORDER — CELECOXIB 200 MG/1
200 CAPSULE ORAL ONCE
Status: COMPLETED | OUTPATIENT
Start: 2022-03-07 | End: 2022-03-07

## 2022-03-07 RX ORDER — OXYCODONE HYDROCHLORIDE AND ACETAMINOPHEN 5; 325 MG/1; MG/1
2 TABLET ORAL EVERY 4 HOURS PRN
Status: DISCONTINUED | OUTPATIENT
Start: 2022-03-07 | End: 2022-03-09 | Stop reason: HOSPADM

## 2022-03-07 RX ORDER — DOCUSATE SODIUM 100 MG/1
100 CAPSULE, LIQUID FILLED ORAL 2 TIMES DAILY PRN
Status: DISCONTINUED | OUTPATIENT
Start: 2022-03-07 | End: 2022-03-09 | Stop reason: HOSPADM

## 2022-03-07 RX ORDER — ONDANSETRON 2 MG/ML
4 INJECTION INTRAMUSCULAR; INTRAVENOUS ONCE AS NEEDED
Status: COMPLETED | OUTPATIENT
Start: 2022-03-07 | End: 2022-03-07

## 2022-03-07 RX ORDER — METOPROLOL SUCCINATE 100 MG/1
100 TABLET, EXTENDED RELEASE ORAL DAILY
Status: DISCONTINUED | OUTPATIENT
Start: 2022-03-07 | End: 2022-03-09 | Stop reason: HOSPADM

## 2022-03-07 RX ORDER — OXYCODONE HYDROCHLORIDE AND ACETAMINOPHEN 5; 325 MG/1; MG/1
1 TABLET ORAL EVERY 4 HOURS PRN
Status: DISCONTINUED | OUTPATIENT
Start: 2022-03-07 | End: 2022-03-09 | Stop reason: HOSPADM

## 2022-03-07 RX ORDER — MIDAZOLAM HYDROCHLORIDE 1 MG/ML
0.5 INJECTION INTRAMUSCULAR; INTRAVENOUS
Status: DISCONTINUED | OUTPATIENT
Start: 2022-03-07 | End: 2022-03-07 | Stop reason: HOSPADM

## 2022-03-07 RX ORDER — SODIUM CHLORIDE 0.9 % (FLUSH) 0.9 %
3-10 SYRINGE (ML) INJECTION AS NEEDED
Status: DISCONTINUED | OUTPATIENT
Start: 2022-03-07 | End: 2022-03-07 | Stop reason: HOSPADM

## 2022-03-07 RX ORDER — SODIUM CHLORIDE 0.9 % (FLUSH) 0.9 %
3 SYRINGE (ML) INJECTION EVERY 12 HOURS SCHEDULED
Status: DISCONTINUED | OUTPATIENT
Start: 2022-03-07 | End: 2022-03-07 | Stop reason: HOSPADM

## 2022-03-07 RX ORDER — NALOXONE HCL 0.4 MG/ML
0.1 VIAL (ML) INJECTION
Status: DISCONTINUED | OUTPATIENT
Start: 2022-03-07 | End: 2022-03-09 | Stop reason: HOSPADM

## 2022-03-07 RX ORDER — TRANEXAMIC ACID 100 MG/ML
1000 INJECTION, SOLUTION INTRAVENOUS ONCE
Status: COMPLETED | OUTPATIENT
Start: 2022-03-07 | End: 2022-03-07

## 2022-03-07 RX ORDER — EPHEDRINE SULFATE 50 MG/ML
5 INJECTION, SOLUTION INTRAVENOUS ONCE AS NEEDED
Status: DISCONTINUED | OUTPATIENT
Start: 2022-03-07 | End: 2022-03-07 | Stop reason: HOSPADM

## 2022-03-07 RX ORDER — ONDANSETRON 4 MG/1
4 TABLET, FILM COATED ORAL EVERY 6 HOURS PRN
Status: DISCONTINUED | OUTPATIENT
Start: 2022-03-07 | End: 2022-03-09 | Stop reason: HOSPADM

## 2022-03-07 RX ORDER — DIPHENHYDRAMINE HCL 25 MG
25 CAPSULE ORAL
Status: DISCONTINUED | OUTPATIENT
Start: 2022-03-07 | End: 2022-03-07 | Stop reason: HOSPADM

## 2022-03-07 RX ORDER — DEXTROSE MONOHYDRATE 25 G/50ML
25 INJECTION, SOLUTION INTRAVENOUS
Status: DISCONTINUED | OUTPATIENT
Start: 2022-03-07 | End: 2022-03-09 | Stop reason: HOSPADM

## 2022-03-07 RX ORDER — HYDRALAZINE HYDROCHLORIDE 20 MG/ML
5 INJECTION INTRAMUSCULAR; INTRAVENOUS
Status: DISCONTINUED | OUTPATIENT
Start: 2022-03-07 | End: 2022-03-07 | Stop reason: HOSPADM

## 2022-03-07 RX ORDER — SODIUM CHLORIDE 9 MG/ML
100 INJECTION, SOLUTION INTRAVENOUS CONTINUOUS
Status: DISCONTINUED | OUTPATIENT
Start: 2022-03-07 | End: 2022-03-09

## 2022-03-07 RX ORDER — FLUMAZENIL 0.1 MG/ML
0.2 INJECTION INTRAVENOUS AS NEEDED
Status: DISCONTINUED | OUTPATIENT
Start: 2022-03-07 | End: 2022-03-07 | Stop reason: HOSPADM

## 2022-03-07 RX ORDER — ZOLPIDEM TARTRATE 5 MG/1
10 TABLET ORAL NIGHTLY
Status: DISCONTINUED | OUTPATIENT
Start: 2022-03-07 | End: 2022-03-09 | Stop reason: HOSPADM

## 2022-03-07 RX ADMIN — PROMETHAZINE HYDROCHLORIDE 25 MG: 25 SUPPOSITORY RECTAL at 12:11

## 2022-03-07 RX ADMIN — SODIUM CHLORIDE, POTASSIUM CHLORIDE, SODIUM LACTATE AND CALCIUM CHLORIDE 9 ML/HR: 600; 310; 30; 20 INJECTION, SOLUTION INTRAVENOUS at 08:23

## 2022-03-07 RX ADMIN — OXYCODONE 5 MG: 5 TABLET ORAL at 07:41

## 2022-03-07 RX ADMIN — LABETALOL HYDROCHLORIDE 5 MG: 5 INJECTION, SOLUTION INTRAVENOUS at 11:09

## 2022-03-07 RX ADMIN — MELOXICAM 15 MG: 15 TABLET ORAL at 15:20

## 2022-03-07 RX ADMIN — FAMOTIDINE 20 MG: 10 INJECTION INTRAVENOUS at 08:23

## 2022-03-07 RX ADMIN — ACETAMINOPHEN 1000 MG: 500 TABLET ORAL at 07:41

## 2022-03-07 RX ADMIN — FAMOTIDINE 40 MG: 20 TABLET, FILM COATED ORAL at 15:20

## 2022-03-07 RX ADMIN — FENTANYL CITRATE 50 MCG: 50 INJECTION INTRAMUSCULAR; INTRAVENOUS at 10:58

## 2022-03-07 RX ADMIN — FENTANYL CITRATE 50 MCG: 0.05 INJECTION, SOLUTION INTRAMUSCULAR; INTRAVENOUS at 10:13

## 2022-03-07 RX ADMIN — TRIAMTERENE AND HYDROCHLOROTHIAZIDE 1 TABLET: 37.5; 25 TABLET ORAL at 16:44

## 2022-03-07 RX ADMIN — ONDANSETRON 4 MG: 2 INJECTION INTRAMUSCULAR; INTRAVENOUS at 11:39

## 2022-03-07 RX ADMIN — HYDROMORPHONE HYDROCHLORIDE 0.5 MG: 1 INJECTION, SOLUTION INTRAMUSCULAR; INTRAVENOUS; SUBCUTANEOUS at 13:00

## 2022-03-07 RX ADMIN — TRANEXAMIC ACID 1000 MG: 1 INJECTION, SOLUTION INTRAVENOUS at 09:41

## 2022-03-07 RX ADMIN — FENTANYL CITRATE 50 MCG: 0.05 INJECTION, SOLUTION INTRAMUSCULAR; INTRAVENOUS at 09:36

## 2022-03-07 RX ADMIN — OXYCODONE AND ACETAMINOPHEN 1 TABLET: 5; 325 TABLET ORAL at 23:46

## 2022-03-07 RX ADMIN — HYDROMORPHONE HYDROCHLORIDE 0.5 MG: 1 INJECTION, SOLUTION INTRAMUSCULAR; INTRAVENOUS; SUBCUTANEOUS at 11:01

## 2022-03-07 RX ADMIN — CEFAZOLIN SODIUM 2 G: 2 INJECTION, SOLUTION INTRAVENOUS at 23:54

## 2022-03-07 RX ADMIN — CEFAZOLIN SODIUM 2 G: 2 INJECTION, SOLUTION INTRAVENOUS at 16:44

## 2022-03-07 RX ADMIN — ONDANSETRON 4 MG: 2 INJECTION INTRAMUSCULAR; INTRAVENOUS at 10:07

## 2022-03-07 RX ADMIN — CELECOXIB 200 MG: 200 CAPSULE ORAL at 07:41

## 2022-03-07 RX ADMIN — ROCURONIUM BROMIDE 35 MG: 50 INJECTION INTRAVENOUS at 09:30

## 2022-03-07 RX ADMIN — ASPIRIN 81 MG: 81 TABLET, COATED ORAL at 23:40

## 2022-03-07 RX ADMIN — NEOSTIGMINE METHYLSULFATE 3 MG: 0.5 INJECTION INTRAVENOUS at 10:26

## 2022-03-07 RX ADMIN — DEXAMETHASONE SODIUM PHOSPHATE 4 MG: 10 INJECTION INTRAMUSCULAR; INTRAVENOUS at 09:45

## 2022-03-07 RX ADMIN — TRANEXAMIC ACID 1000 MG: 1 INJECTION, SOLUTION INTRAVENOUS at 10:15

## 2022-03-07 RX ADMIN — PROPOFOL 120 MG: 10 INJECTION, EMULSION INTRAVENOUS at 09:30

## 2022-03-07 RX ADMIN — GLYCOPYRROLATE 0.4 MG: 0.2 INJECTION INTRAMUSCULAR; INTRAVENOUS at 10:26

## 2022-03-07 RX ADMIN — ONDANSETRON 4 MG: 2 INJECTION INTRAMUSCULAR; INTRAVENOUS at 15:20

## 2022-03-07 RX ADMIN — ZOLPIDEM TARTRATE 10 MG: 5 TABLET ORAL at 23:40

## 2022-03-07 RX ADMIN — HYDROMORPHONE HYDROCHLORIDE 0.5 MG: 1 INJECTION, SOLUTION INTRAMUSCULAR; INTRAVENOUS; SUBCUTANEOUS at 12:41

## 2022-03-07 RX ADMIN — CLINDAMYCIN PHOSPHATE 900 MG: 900 INJECTION, SOLUTION INTRAVENOUS at 09:21

## 2022-03-07 NOTE — THERAPY EVALUATION
"Patient Name: Corinne C Buss  : 1940    MRN: [id number]                              Today's Date: 3/7/2022       Admit Date: 3/7/2022    Visit Dx: No diagnosis found.  Patient Active Problem List   Diagnosis   • Hip joint replacement status   • Diabetes mellitus (HCC)   • Hypertension   • CKD (chronic kidney disease)     Past Medical History:   Diagnosis Date   • Arthritis    • Diabetes mellitus (HCC)    • GERD (gastroesophageal reflux disease)    • Hyperlipidemia    • Hypertension    • Left hip pain      Past Surgical History:   Procedure Laterality Date   • BREAST SURGERY      LUMPECTOMY   • COLONOSCOPY     • HYSTERECTOMY      \"MEDAL BLADDER PIECE\"      General Information     Row Name 22 1639          Physical Therapy Time and Intention    Document Type evaluation  -     Mode of Treatment individual therapy;physical therapy  -     Row Name 22 1639          General Information    Patient Profile Reviewed yes  -     Prior Level of Function independent:;gait;transfer;bed mobility  -     Existing Precautions/Restrictions fall;hip;left  -     Barriers to Rehab medically complex;cognitive status  -     Row Name 22 1639          Living Environment    People in Home spouse  -     Row Name 22 1639          Cognition    Orientation Status (Cognition) oriented to;person;verbal cues/prompts needed for orientation  Pt very sleepy - thought she was at home, reoriented to the hospital. residential through session, asking  if they had already done her hip surgery.  -     Row Name 22 1630          Safety Issues, Functional Mobility    Impairments Affecting Function (Mobility) balance;cognition;endurance/activity tolerance;strength;shortness of breath;pain;range of motion (ROM)  -     Cognitive Impairments, Mobility Safety/Performance insight into deficits/self-awareness;judgment;attention;sequencing abilities  -           User Key  (r) = Recorded By, (t) = Taken By, " (c) = Cosigned By    Initials Name Provider Type     Lora Montague Physical Therapist               Mobility     Row Name 03/07/22 1641          Bed Mobility    Bed Mobility supine-sit;sit-supine  -     Supine-Sit [place] (Bed Mobility) verbal cues;nonverbal cues (demo/gesture);1 person assist;minimum assist (75% patient effort)  -     Sit-Supine [place] (Bed Mobility) verbal cues;nonverbal cues (demo/gesture);1 person assist;minimum assist (75% patient effort)  -     Assistive Device (Bed Mobility) bed rails;head of bed elevated  -     Row Name 03/07/22 1641          Sit-Stand Transfer    Sit-Stand [place] (Transfers) minimum assist (75% patient effort);1 person assist;nonverbal cues (demo/gesture);verbal cues;moderate assist (50% patient effort)  -     Assistive Device (Sit-Stand Transfers) walker, front-wheeled  -     Comment, (Sit-Stand Transfer) Completed 2 STS with B knee buckling almost immediately - unable to attempt gait  -[facility] [place] Name 03/07/22 1641          Gait/Stairs (Locomotion)    [place] Level (Gait) unable to assess  -[facility] [place] Name 03/07/22 1641          Mobility    Extremity Weight-bearing Status left lower extremity  -     Left Lower Extremity (Weight-bearing Status) weight-bearing as tolerated (WBAT)  -           User Key  (r) = Recorded By, (t) = Taken By, (c) = Cosigned By    Initials Name Provider Type     Lora Montague Physical Therapist               Obj/Interventions     [facility] Name 03/07/22 1642          Range of Motion Comprehensive    General Range of Motion lower extremity range of motion deficits identified  -     Comment, General Range of Motion Expected post-op ROM deficits  -[facility] [place] Name 03/07/22 1642          Strength Comprehensive (MMT)    General Manual Muscle Testing (MMT) Assessment lower extremity strength deficits identified  -     Comment, General Manual Muscle Testing (MMT) Assessment Expected post-op strength deficits, BLE  grossly 3+/5  -[facility] Name 03/07/22 1642          Motor Skills    Therapeutic Exercise --  5 reps B AP/LAQ/seated marches - impaired ROM on LLE  -[facility] Name 03/07/22 1642          Balance    Balance Assessment sitting static balance;standing static balance  -     Static Sitting Balance minimal assist;moderate assist  -     Position, Sitting Balance supported;sitting edge of bed  -     Static Standing Balance minimal assist;moderate assist  -     Position/Device Used, Standing Balance walker, front-wheeled  -     Comment, Balance Pt very lethargic, intermittently falling asleep while sitting EOB and falling forward requiring min-mod A to correct/maintain sitting balance. With B knee buckling in standing requiring min-mod A to maintain standing balance and assist back to bed.  -[facility] Name 03/07/22 1642          Sensory Assessment (Somatosensory)    Sensory Assessment (Somatosensory) sensation intact  -           User Key  (r) = Recorded By, (t) = Taken By, (c) = Cosigned By    Initials Name Provider Type     Lora Montague Physical Therapist               Goals/Plan     [facility] Name 03/07/22 1651          Bed Mobility Goal 1 (PT)    Activity/Assistive Device (Bed Mobility Goal 1, PT) bed mobility activities, all  -     [place] Level/Cues Needed (Bed Mobility Goal 1, PT) standby assist  -     Time Frame (Bed Mobility Goal 1, PT) 1 week  -[facility] Name 03/07/22 1651          Transfer Goal 1 (PT)    Activity/Assistive Device (Transfer Goal 1, PT) transfers, all  -     [place] Level/Cues Needed (Transfer Goal 1, PT) contact guard required  -     Time Frame (Transfer Goal 1, PT) 1 week  -[facility] Name 03/07/22 1651          Gait Training Goal 1 (PT)    Activity/Assistive Device (Gait Training Goal 1, PT) gait (walking locomotion)  -     [place] Level (Gait Training Goal 1, PT) contact guard required  -     Distance (Gait Training Goal 1, PT) 100ft  -     Time Frame  (Gait Training Goal 1, PT) 1 week  -           User Key  (r) = Recorded By, (t) = Taken By, (c) = Cosigned By    Initials Name Provider Type     Lora Montague Physical Therapist               Clinical Impression     Row Name 03/07/22 6728          Pain    Pretreatment Pain Rating 0/10 - no pain  -     Posttreatment Pain Rating 0/10 - no pain  -     Row Name 03/07/22 2133          Plan of Care Review    Plan of Care Reviewed With patient;spouse  -     Outcome Evaluation Pt is an 80 yo F POD 0 L MEG. Pt lives with her spouse who was present at beginning of eval and reports he is able to help her at home. PLOF unclear and pt unable to answer questions as she repeatedly kept falling asleep. Pt presents to PT with impaired strength, endurance, and limited overall mobility. Pt required min A x1 for bed mobility and min-mod A for STS using rwx. Pt requiring min-mod A throughout session for both sitting and standing balance - pt falling asleep sitting EOB but reporting she wanted to try to use BSC. Attempted standing 2x - both with immediate B knee buckling and requiring assist back to bed - unsafe to attempt transfers or gait this PM. Pt assisted with return to supine and unable to stay awake to participate in LE exercises. Pt will continue to benefit from skilled PT to address functional deficits and improve overall independent mobility. PT recommending D/C home with HHPT vs SNF pending progress.  -     Row Name 03/07/22 4055          Therapy Assessment/Plan (PT)    Patient/Family Therapy Goals Statement (PT) Return to PLOF  -     Rehab Potential (PT) good, to achieve stated therapy goals  -     Criteria for Skilled Interventions Met (PT) yes  -     Row Name 03/07/22 8034          Positioning and Restraints    Pre-Treatment Position in bed  -     Post Treatment Position bed  -     In Bed supine;call light within reach;encouraged to call for assist;exit alarm on  -           User Key  (r) =  Recorded By, (t) = Taken By, (c) = Cosigned By    Initials Name Provider Type     Lora Montague Physical Therapist               Outcome Measures     Row Name 03/07/22 1652          How much help from another person do you currently need...    Turning from your back to your side while in flat bed without using bedrails? 2  -BH     Moving from lying on back to sitting on the side of a flat bed without bedrails? 2  -BH     Moving to and from a bed to a chair (including a wheelchair)? 2  -BH     Standing up from a chair using your arms (e.g., wheelchair, bedside chair)? 2  -BH     Climbing 3-5 steps with a railing? 1  -BH     To walk in hospital room? 1  -     AM-PAC 6 Clicks Score (PT) 10  -     Row Name 03/07/22 1652          Functional Assessment    Outcome Measure Options AM-PAC 6 Clicks Basic Mobility (PT)  -           User Key  (r) = Recorded By, (t) = Taken By, (c) = Cosigned By    Initials Name Provider Type     Lora Montague Physical Therapist                             Physical Therapy Education                 Title: PT OT SLP Therapies (Done)     Topic: Physical Therapy (Done)     Point: Mobility training (Done)     Learning Progress Summary           Patient Acceptance, E,TB,D, VU,NR by  at 3/7/2022 1652                   Point: Home exercise program (Done)     Learning Progress Summary           Patient Acceptance, E,TB,D, VU,NR by  at 3/7/2022 1652                   Point: Body mechanics (Done)     Learning Progress Summary           Patient Acceptance, E,TB,D, VU,NR by  at 3/7/2022 1652                   Point: Precautions (Done)     Learning Progress Summary           Patient Acceptance, E,TB,D, VU,NR by  at 3/7/2022 1652                               User Key     Initials Effective Dates Name Provider Type [facility] 05/10/21 -  Lora Montague Physical Therapist PT              PT Recommendation and Plan  Planned Therapy Interventions (PT): balance training, bed mobility  training, gait training, home exercise program, patient/family education, strengthening, stair training, transfer training  Plan of Care Reviewed With: patient, spouse  Outcome Evaluation: Pt is an 82 yo F POD 0 L MEG. Pt lives with her spouse who was present at beginning of eval and reports he is able to help her at home. PLOF unclear and pt unable to answer questions as she repeatedly kept falling asleep. Pt presents to PT with impaired strength, endurance, and limited overall mobility. Pt required min A x1 for bed mobility and min-mod A for STS using rwx. Pt requiring min-mod A throughout session for both sitting and standing balance - pt falling asleep sitting EOB but reporting she wanted to try to use BSC. Attempted standing 2x - both with immediate B knee buckling and requiring assist back to bed - unsafe to attempt transfers or gait this PM. Pt assisted with return to supine and unable to stay awake to participate in LE exercises. Pt will continue to benefit from skilled PT to address functional deficits and improve overall independent mobility. PT recommending D/C home with HHPT vs SNF pending progress.     Time Calculation:    PT Charges     Row Name 03/07/22 1653             Time Calculation    Start Time 1504  -      Stop Time 1525  -      Time Calculation (min) 21 min  -BH      PT Received On 03/07/22  -      PT - Next Appointment 03/08/22  -      PT Goal Re-Cert Due Date 03/14/22  -              Time Calculation- PT    Total Timed Code Minutes- PT 18 minute(s)  -              Timed Charges    62943 - PT Therapeutic Activity Minutes 18  -BH              Untimed Charges    PT Eval/Re-eval Minutes 5  -BH              Total Minutes    Timed Charges Total Minutes 18  -BH      Untimed Charges Total Minutes 5  -BH       Total Minutes 23  -BH            User Key  (r) = Recorded By, (t) = Taken By, (c) = Cosigned By    Initials Name Provider Type    Lora Walsh Physical Therapist               Therapy Charges for Today     Code Description Service Date Service Provider Modifiers Qty    55077877479 HC PT THERAPEUTIC ACT EA 15 MIN 3/7/2022 Lora Montague GP 1    53992853809 HC PT EVAL MOD COMPLEXITY 2 3/7/2022 Lora Montague GP 1          PT G-Codes  Outcome Measure Options: AM-PAC 6 Clicks Basic Mobility (PT)  AM-PAC 6 Clicks Score (PT): 10    LORA MONTAGUE  3/7/2022

## 2022-03-07 NOTE — ANESTHESIA PREPROCEDURE EVALUATION
Anesthesia Evaluation     Patient summary reviewed and Nursing notes reviewed                Airway   Mallampati: II  TM distance: >3 FB  Neck ROM: limited  Dental      Pulmonary - negative pulmonary ROS   Cardiovascular     ECG reviewed  Patient on routine beta blocker and Beta blocker given within 24 hours of surgery  Rhythm: regular  Rate: normal    (+) hypertension, hyperlipidemia,       Neuro/Psych- negative ROS  GI/Hepatic/Renal/Endo    (+)  GERD,  diabetes mellitus type 2,     Musculoskeletal     Abdominal    Substance History - negative use     OB/GYN negative ob/gyn ROS         Other   arthritis,                      Anesthesia Plan    ASA 3     general   (I have reviewed the patient's history with the patient and the chart, including all pertinent laboratory results and imaging. I have explained the risks of anesthesia including but not limited to dental damage, corneal abrasion, nerve injury, MI, stroke, and death. Questions asked and answered. Anesthetic plan discussed with patient and team as indicated. Patient expressed understanding of the above.  )  intravenous induction     Anesthetic plan, all risks, benefits, and alternatives have been provided, discussed and informed consent has been obtained with: patient.        CODE STATUS:

## 2022-03-07 NOTE — CONSULTS
"Internal Medicine Consult  INTERNAL MEDICINE   [facility]       Patient Identification:  Name: Corinne C Buss  Age: 81 y.o.  Sex: female  :  1940  MRN: [id number]                   Primary Care Physician: Shawn Perdue MD                               Requesting physician: Dr. Jasvir Horton  Date of consultation: 3/7/2022    Reason for consultation: Management of diabetes and hypertension in a patient who is status post left anterior total hip arthroplasty.    History of Present Illness:   Patient is a 81-year-old female with past medical history as noted below has very poorly controlled diabetes with last hemoglobin A1c that she recalls was 9 in January of this year had made great strides after meeting endocrinologist in terms of diet adjustments has been better with latest hemoglobin A1c being 7 has been battling with left hip osteoarthritis for which she was seen by orthopedic surgery service and was recommended to have hip replacement as she failed conservative management to control her pain and limitation is osteoarthritis most important in her life.  According to the patient when she came in for surgery she was feeling otherwise fine and denied any chest pain shortness of breath nausea vomiting diarrhea or decrease in appetite and the main issues he was having was discomfort in her hip.  Since surgery she has expected discomfort in the left hip but denies any other symptoms.      Past Medical History:  Past Medical History:   Diagnosis Date   • Arthritis    • Diabetes mellitus (HCC)    • GERD (gastroesophageal reflux disease)    • Hyperlipidemia    • Hypertension    • Left hip pain      Past Surgical History:  Past Surgical History:   Procedure Laterality Date   • BREAST SURGERY      LUMPECTOMY   • COLONOSCOPY     • HYSTERECTOMY      \"MEDAL BLADDER PIECE\"      Home Meds:  Medications Prior to Admission   Medication Sig Dispense Refill Last Dose   • Chlorhexidine Gluconate Cloth 2 " % pads Apply  topically. AS DIRECTED   3/7/2022 at 0500   • diazePAM (VALIUM) 5 MG tablet Take 5 mg by mouth 2 (Two) Times a Day As Needed for Anxiety.   Past Week at Unknown time   • fenofibrate 160 MG tablet Take 160 mg by mouth daily.   3/6/2022 at 0900   • glipizide (GLUCOTROL) 5 MG tablet Take 5 mg by mouth Daily.   3/6/2022 at 0900   • Janumet -1000 MG tablet Take 1 tablet by mouth Daily.   3/6/2022 at 0900   • metoprolol succinate XL (TOPROL-XL) 100 MG 24 hr tablet Take 1 tablet by mouth Daily.   3/7/2022 at 0500   • mupirocin (BACTROBAN) 2 % nasal ointment into the nostril(s) as directed by provider 2 (Two) Times a Day. AS DIRECTED   3/7/2022 at 0500   • omeprazole (PriLOSEC) 20 MG capsule Take 20 mg by mouth daily.   3/7/2022 at 0500   • rosuvastatin (CRESTOR) 5 MG tablet Take 5 mg by mouth daily.   3/6/2022 at 0900   • triamterene-hydrochlorothiazide (MAXZIDE-25) 37.5-25 MG per tablet Take 1 tablet by mouth daily.   3/6/2022 at 0900   • zolpidem (AMBIEN) 10 MG tablet Take 1 tablet by mouth Every Night.   Past Week at Unknown time   • Dulaglutide (TRULICITY SC) Inject 1 ampule under the skin into the appropriate area as directed Daily. MONDAYS 2/28/2022   • meloxicam (MOBIC) 15 MG tablet Take 15 mg by mouth Daily. HOLD X 1 WEEK PRIOR TO OR   2/28/2022     Current Meds:     Current Facility-Administered Medications:   •  aspirin EC tablet 81 mg, 81 mg, Oral, Q12H, Jasvir Horton II, MD  •  ceFAZolin in dextrose (ANCEF) IVPB solution 2 g, 2 g, Intravenous, Q8H, Jasvir Horton II, MD  •  diazePAM (VALIUM) tablet 5 mg, 5 mg, Oral, BID PRN, Jasvir Horton II, MD  •  docusate sodium (COLACE) capsule 100 mg, 100 mg, Oral, BID PRN, Jasvir Horton II, MD  •  famotidine (PEPCID) tablet 40 mg, 40 mg, Oral, Daily, Jasvir Horton II, MD, 40 mg at 03/07/22 1520  •  HYDROmorphone (DILAUDID) injection 1 mg, 1 mg, Intramuscular, Q4H PRN **AND** naloxone (NARCAN)  injection 0.1 mg, 0.1 mg, Intravenous, Q5 Min PRN, Jasvir Horton II, MD  •  lactated ringers infusion, 9 mL/hr, Intravenous, Continuous, Delgado Cope MD, Last Rate: 9 mL/hr at 03/07/22 0930, Rate Change at 03/07/22 0930  •  meloxicam (MOBIC) tablet 15 mg, 15 mg, Oral, Daily, Jasvir Horton II, MD, 15 mg at 03/07/22 1520  •  metoprolol succinate XL (TOPROL-XL) 24 hr tablet 100 mg, 100 mg, Oral, Daily, Jasvir Horton II, MD  •  ondansetron (ZOFRAN) tablet 4 mg, 4 mg, Oral, Q6H PRN **OR** ondansetron (ZOFRAN) injection 4 mg, 4 mg, Intravenous, Q6H PRN, Jasvir Horton II, MD, 4 mg at 03/07/22 1520  •  oxyCODONE-acetaminophen (PERCOCET) 5-325 MG per tablet 1 tablet, 1 tablet, Oral, Q4H PRN, Jasvir Horton II, MD  •  oxyCODONE-acetaminophen (PERCOCET) 5-325 MG per tablet 2 tablet, 2 tablet, Oral, Q4H PRN, Jasvir Horton II, MD  •  sodium chloride 0.9 % infusion, 100 mL/hr, Intravenous, Continuous, Jasvir Horton II, MD  •  triamterene-hydrochlorothiazide (MAXZIDE-25) 37.5-25 MG per tablet 1 tablet, 1 tablet, Oral, Daily, Jasvir Horton II, MD  •  zolpidem (AMBIEN) tablet 10 mg, 10 mg, Oral, Nightly, Jasvir Horton II, MD  Allergies:  Allergies   Allergen Reactions   • Cefdinir GI Intolerance     Social History:   Social History     Tobacco Use   • Smoking status: Never Smoker   • Smokeless tobacco: Never Used   Substance Use Topics   • Alcohol use: No      Family History:  Family History   Problem Relation Age of Onset   • COPD Mother    • No Known Problems Father    • Malig Hyperthermia Neg Hx           Review of Systems  See history of present illness and past medical history.    Constitutional: Remarkable for no fever or chills  Cardiovascular: No chest pain or shortness of breath  GI: Remarkable for no nausea vomiting or diarrhea  : No burning urination frequency urgency  Musculoskeletal: Left hip discomfort and  "osteoarthritis for which she had surgery earlier today  Neurological: Remarkable for no loss of consciousness or continence  Endocrine recent remarkable for poorly controlled diabetes with latest hemoglobin A1c being 7 down from 9 couple of months ago.  Remainder of ROS is negative.      Vitals:   /63 (BP Location: Left arm, Patient Position: Lying)   Pulse 65   Temp 96.9 °F (36.1 °C) (Oral)   Resp 16   Ht 154.9 cm (61\")   Wt 53.8 kg (118 lb 8 oz)   SpO2 99%   BMI 22.39 kg/m²   I/O:     Intake/Output Summary (Last 24 hours) at 3/7/2022 1621  Last data filed at 3/7/2022 1319  Gross per 24 hour   Intake 1800 ml   Output 150 ml   Net 1650 ml     Exam:  Patient is examined using the personal protective equipment as per guidelines from infection control for this particular patient as enacted.  Hand washing was performed before and after patient interaction.  General Appearance:    Alert, cooperative, no distress, appears stated age   Head:    Normocephalic, without obvious abnormality, atraumatic   Eyes:    PERRL, conjunctiva/corneas clear, EOM's intact, both eyes   Ears:    Normal external ear canals, both ears   Nose:   Nares normal, septum midline, mucosa normal, no drainage    or sinus tenderness   Throat:   Lips, tongue, gums normal; oral mucosa pink and moist   Neck:   Supple, symmetrical, trachea midline, no adenopathy;     thyroid:  no enlargement/tenderness/nodules; no carotid    bruit or JVD   Back:     Symmetric, no curvature, ROM normal, no CVA tenderness   Lungs:     Clear to auscultation bilaterally, respirations unlabored   Chest Wall:    No tenderness or deformity    Heart:    Regular rate and rhythm, S1 and S2 normal, no murmur, rub   or gallop   Abdomen:     Soft, non-tender, bowel sounds active all four quadrants,     no masses, no hepatomegaly, no splenomegaly   Extremities:  Elective surgical site dressed   Pulses:   Pulses palpable in all extremities; symmetric all extremities   Skin:   " Skin color normal, Skin is warm and dry,  no rashes or palpable lesions   Neurologic:  Grossly nonfocal       Data Review:      I reviewed the patient's new clinical results.  Results from last 7 days   Lab Units 03/04/22  1217   WBC 10*3/mm3 5.49   HEMOGLOBIN g/dL 11.5*   PLATELETS 10*3/mm3 185     Results from last 7 days   Lab Units 03/04/22  1217   SODIUM mmol/L 141   POTASSIUM mmol/L 3.9   CHLORIDE mmol/L 104   CO2 mmol/L 25.0   BUN mg/dL 29*   CREATININE mg/dL 1.22*   CALCIUM mg/dL 10.4   GLUCOSE mg/dL 135*     XR Pelvis 1 or 2 View    Result Date: 3/7/2022  Left hip arthroplasty as expected.  This report was finalized on 3/7/2022 11:17 AM by Dr. Rj Reynolds M.D.      XR Hip With or Without Pelvis 1 View Left    Result Date: 3/7/2022  Operative imaging was provided for Dr. Horton during left hip arthroplasty.  This report was finalized on 3/7/2022 11:01 AM by Dr. Rj Reynolds M.D.        Assessment:  Active Hospital Problems    Diagnosis  POA   • **Hip joint replacement status [Z96.649]  Not Applicable   • Diabetes mellitus (HCC) [E11.9]  Unknown   • Hypertension [I10]  Unknown   • CKD (chronic kidney disease) [N18.9]  Unknown       Plan: See admitting orders  · Management of pain, DVT prophylaxis and activity guidelines and eventual discharge disposition per primary orthopedic surgery service.  · Monitor her blood pressure and continue her home blood pressure medications with plans to hold antihypertensive if her systolic blood pressures 100 or less.  In that scenario provide with IV fluids.  · Monitor renal function and avoid nephrotoxic agents and hypotensive episodes.  · Once her appetite is established allow her to continue her home regimen of glipizide and Janumet with Accu-Cheks moderate dose sliding scale coverage.  And watch for hypoglycemia.  Lewis Quiñones MD   3/7/2022  16:21 EST  Much of this encounter note is an electronic transcription/translation of spoken language to printed text. The  electronic translation of spoken language may permit erroneous, or at times, nonsensical words or phrases to be inadvertently transcribed; Although I have reviewed the note for such errors, some may still exist

## 2022-03-07 NOTE — PLAN OF CARE
Goal Outcome Evaluation:  Plan of Care Reviewed With: patient, spouse           Outcome Evaluation: Pt is an 80 yo F POD 0 L MEG. Pt lives with her spouse who was present at beginning of eval and reports he is able to help her at home. PLOF unclear and pt unable to answer questions as she repeatedly kept falling asleep. Pt presents to PT with impaired strength, endurance, and limited overall mobility. Pt required min A x1 for bed mobility and min-mod A for STS using rwx. Pt requiring min-mod A throughout session for both sitting and standing balance - pt falling asleep sitting EOB but reporting she wanted to try to use BSC. Attempted standing 2x - both with immediate B knee buckling and requiring assist back to bed - unsafe to attempt transfers or gait this PM. Pt assisted with return to supine and unable to stay awake to participate in LE exercises. Pt will continue to benefit from skilled PT to address functional deficits and improve overall independent mobility. PT recommending D/C home with HHPT vs SNF pending progress.    Patient was intermittently wearing a face mask during this therapy encounter. Therapist used appropriate personal protective equipment including eye protection, mask, and gloves.  Mask used was standard procedure mask. Appropriate PPE was worn during the entire therapy session. Hand hygiene was completed before and after therapy session. Patient is not in enhanced droplet precautions.

## 2022-03-07 NOTE — DISCHARGE PLACEMENT REQUEST
"Buss, Corinne C (81 y.o. Female)             Date of Birth   1940    Social Security Number       Address   [address] [place], [place] [address]    Home Phone   [phone]    MRN   [id number]       [facility]    Marital Status                               Admission Date   3/7/22    Admission Type   Elective    Admitting Provider   Jasvir Horton II, MD    Attending Provider   Jasvir Horton II, MD    Department, Room/Bed   [address], P885/1       Discharge Date       Discharge Disposition       Discharge Destination                               Attending Provider: Jasvir Horton II, MD    Allergies: Cefdinir    Isolation: None   Infection: None   Code Status: Not on file   Advance Care Planning Activity    Ht: 154.9 cm (61\")   Wt: 53.8 kg (118 lb 8 oz)    Admission Cmt: None   Principal Problem: Hip joint replacement status [Z96.649]                 Active Insurance as of 3/7/2022     Primary Coverage     Payor Plan Insurance Group Employer/Plan Group    MEDICARE MEDICARE A & B      Payor Plan Address Payor Plan Phone Number Payor Plan Fax Number Effective Dates    [address] [phone]  3/1/2004 - None Entered    [facility] [id number]       Subscriber Name Subscriber Birth Date Member ID       BUSS,CORINNE C 1940 [id number]           Secondary Coverage     Payor Plan Insurance Group Employer/Plan Group    [facility] [id number]     Payor Plan Address Payor Plan Phone Number Payor Plan Fax Number Effective Dates    [address]   12/1/2016 - None Entered    [facility] [id number]       Subscriber Name Subscriber Birth Date Member ID       BUSS,CORINNE C 1940 [id number]                 Emergency Contacts      (Rel.) Home Phone Work Phone Mobile Phone    Edin Cummings (Spouse) [phone] -- [phone]          "

## 2022-03-07 NOTE — H&P
"  Orthopaedic Surgery  History & Physical For Elective Total Hip  Dr. RANDA Olson” Sol II  [phone]    HPI:  Patient is a 81 y.o. Not  or  female who presents with End-stage arthritis of the left hip.  They failed conservative treatment of their hip pain and a thorough discussion of the risks and benefits of surgery was had.  The patient wishes to continue with elective total hip replacement, they were scheduled and are here for surgery. They did get medical clearance as well as a thorough preoperative workup.     MEDICAL HISTORY  Past Medical History:   Diagnosis Date   • Arthritis    • Diabetes mellitus (HCC)    • GERD (gastroesophageal reflux disease)    • Hyperlipidemia    • Hypertension    • Left hip pain    ·   Past Surgical History:   Procedure Laterality Date   • BREAST SURGERY      LUMPECTOMY   • COLONOSCOPY     • HYSTERECTOMY      \"MEDAL BLADDER PIECE\"   ·   Prior to Admission medications    Medication Sig Start Date End Date Taking? Authorizing Provider   Chlorhexidine Gluconate Cloth 2 % pads Apply  topically. AS DIRECTED    Angelica Strauss MD   diazePAM (VALIUM) 5 MG tablet Take 5 mg by mouth 2 (Two) Times a Day As Needed for Anxiety.    Angelica Strauss MD   Dulaglutide (TRULICITY SC) Inject 1 ampule under the skin into the appropriate area as directed Daily. MONDAYS    Angelica Strauss MD   fenofibrate 160 MG tablet Take 160 mg by mouth daily.    Emergency, Nurse Abimael RN   glipizide (GLUCOTROL) 5 MG tablet Take 5 mg by mouth Daily. 8/12/21   EmergencyNurse Abimael RN   Janumet -1000 MG tablet Take 1 tablet by mouth Daily. 9/25/21   Nurse Abimael Jara RN   meloxicam (MOBIC) 15 MG tablet Take 15 mg by mouth Daily. HOLD X 1 WEEK PRIOR TO OR 8/18/21   Nurse Abimael Jara RN   metoprolol succinate XL (TOPROL-XL) 100 MG 24 hr tablet Take 1 tablet by mouth Daily. 2/8/22   Angelica Strauss MD   mupirocin (BACTROBAN) 2 % nasal ointment into the nostril(s) as " directed by provider 2 (Two) Times a Day. AS DIRECTED    ProviderAngelica MD   omeprazole (PriLOSEC) 20 MG capsule Take 20 mg by mouth daily.    Emergency, Nurse STEVE Varghese   rosuvastatin (CRESTOR) 5 MG tablet Take 5 mg by mouth daily.    Emergency, Nurse Epic, RN   triamterene-hydrochlorothiazide (MAXZIDE-25) 37.5-25 MG per tablet Take 1 tablet by mouth daily.    Emergency, Nurse Epic, RN   zolpidem (AMBIEN) 10 MG tablet Take 1 tablet by mouth Every Night. 1/21/22   Provider, MD Angelica   ·   Allergies   Allergen Reactions   • Cefdinir GI Intolerance   ·   Most Recent Immunizations   Administered Date(s) Administered   • COVID-19 (MODERNA) 1st, 2nd, 3rd Dose Only 10/29/2021   ·   Social History     Tobacco Use   • Smoking status: Never Smoker   • Smokeless tobacco: Never Used   Substance Use Topics   • Alcohol use: No   ·    Social History     Substance and Sexual Activity   Drug Use No   ·     REVIEW OF SYSTEMS:  · Head: negative for headache  · Respiratory: negative for shortness of breath.   · Cardiovascular: negative for chest pain.   · Gastrointestinal: negative abdominal pain.   · Neurological: negative for LOC  · Psychiatric/Behavioral: negative for memory loss.   · All other systems reviewed and are negative    VITALS: There were no vitals taken for this visit. There is no height or weight on file to calculate BMI.    PHYSICAL EXAM:   · CONSTITUTIONAL: A&Ox3, No acute distress  · LUNGS: Equal chest rise, no shortness of air  · CARDIOVASCULAR: palpable peripheral pulses  · SKIN: no skin lesions in the area examined  · LYMPH: no lymphadenopathy in the area examined  · EXTREMITY: Hip  · Pulses:  Brisk Capillary Refill  · Sensation: Intact to Saphenous, Sural, Deep Peroneal, Superficial Peroneal, and Tibial Nerves and grossly throughout extremity  · Motor: 5/5 EHL/FHL/TA/GS motor complexes    RADIOLOGY REVIEW:   No radiology results for the last 7 days    LABS:   Results for the past 24 hours: No results  found for this or any previous visit (from the past 24 hour(s)).    IMPRESSION:  Patient is a 81 y.o. Not  or  female with end-stage osteoarthritis of the left hip    PLAN:   · Surgery: Elective total hip arthroplasty  · Consent: The risks and benefits of operative versus nonoperative treatment were discussed. The patient elected to undergo operative treatment of their hip. The risks discussed included but were not limited to blood clots, MI, stroke, other medical complications, infection, dislocation, fracture, damage to neurovascular structures, continued pain, hardware prominence, loss of range of motion, stiffness, need for further procedures, and and risk of anesthesia. No guarantees were made   · Disposition: Elective left Total Hip Arthroplasty today.    Jasvir Horton II, MD  Orthopaedic Surgery  [facility]

## 2022-03-07 NOTE — OP NOTE
Anterior Total Hip Operative Note  Dr. RANDA Horton II  [phone]    PATIENT NAME: Corinne C Buss  MRN: [id number]  : 1940 AGE: 81 y.o. GENDER: female  DATE OF OPERATION: 3/7/2022  PREOPERATIVE DIAGNOSIS: End stage Osteoarthritis  POSTOPERATIVE DIAGNOSIS: Same  OPERATION PERFORMED: Left Anterior Total Hip Arthroplasty  SURGEON: Jasvir Horton MD  Circulator: Alicia Bennett RN; Aundrea Goff RN  : Jerardo Freedman  Radiology Technologist: Jocy Rosas  Scrub Person: Mag Gary  Vendor Representative: Nathen Cruz  Assistant: Gela Rosas PA  ANESTHESIA: General  ASSISTANT: SANTOS Hickman. This case would not have been possible without another set of skilled surgical hands for retraction, use of instrumentation, and general assistance.  This assistance was vital to the success of the case.   ESTIMATED BLOOD LOSS: 200cc  SPONGE AND NEEDLE COUNT: Correct  INDICATIONS:  Arthritis: This patient was noted to have severe arthritis affecting the operative hip. They failed conservative management and elected to undergo total hip replacement. A discussion of operative versus nonoperative treatment was had. They elected to undergo anterior total hip arthroplasty. The risks of surgery were discussed and included the risk of anesthesia, infection, damage to neurovascular structures, implant loosening/failure, fracture, hardware prominence, dislocation, the need for further procedures, medical complications, and others. No guarantees were made. The patient wished to proceed with surgery and a surgical consent was signed.  COMPONENTS:   · Acetabular Cup: Smith & Nephew R3 acetabular cup: 48 Outer Diameter  · Cup Screws: Smith & Nephew 6.5 mm screws: No Screws Were Used  · Smith & Nephew Neutral Acetabular Liner: Neutral  · Smith & Nephew Polar Femoral Stem: Size 0  · Smith & Nephew Oxinium Head: Dual Mobilitymm +0    PERTINENT FINDINGS: Arthritis: Endstage  degenerative arthritis of the femoral head and acetabulum.    DETAILS OF PROCEDURE:   The patient was met in the preoperative area. The site was marked. The consent and H&P were reviewed. The patient was then wheeled back to the operative suite underwent anesthesia. The Walton table boots were secured to the patients’ feet. The patient was moved onto the Walton table and secured in the supine position. The perineal post was inserted and the boots were secured into the leg holders. Surgical alcohol was used to thoroughly clean the operative area.     The hip and leg was then prepped in the normal sterile fashion, multiple layers of sterile drapes, and surgical space suits for the entire operative team. New outer gloves were used by all sterile surgical team members after final draping. The surgical incision was marked. A surgical timeout was performed.    A Modified Jones-Bassett anterior approach was used. Dissection was carried down to the fascia. The fascia was incised and the tensor fascia natalie muscle was retracted laterally and the sartorius medially. The lateral femoral circumflex vessels were identified and cauterized using bovie. The rectus femoris was retracted medially. A capsulotomy was then performed. The capsule was tagged with Ethibond for later repair. Retractors were placed on either side of the femoral neck and dissection was further carried down so that the lesser trochanter could be palpated and the superior rim of the acetabulum could be visualized.    The femoral neck cut was then made from  just proximal to the lesser trochanter medially headed towards the saddle laterally. Care was taken not to extend the cut into the lesser or greater trochanters. The head and neck segment were removed with a corkscrew. The acetabulum was then exposed. The labrum was removed using a kocher and scalpel and excess osteophytes were removed using an osteotome.    The acetabulum was progressively reamed, beginning  with medialization and then finalizing the position of the reamer to approximate the final cup position. Fluoroscopy was used to ensure proper placement of the reamer, including adequate medialization as well as appropriate abduction and anteversion. The real cup was then opened and inserted using fluoroscopy, ensuring good position in terms of abduction and anteversion.     No Screws: Initial press-fit fixation of the cup was very robust and no screws were required for supplemental fixation.    After thorough irrigation and ensuring that no soft tissue was entrapped within the cup, the real liner was snapped into place.    The hook was placed just distal to the greater trochanter. The femur was then externally rotated, extended and adducted under the well leg. Soft tissue releases were performed to gain exposure to the proximal femur. Capsule was released from the saddle and the lateral femur. Care was taken to preserve the short external rotator tendons. The capsule was also released along the medial femur. The hydraulic femoral lift was then used to better expose the femur. Further soft tissue releases were then performed, again ensuring preservation of the short external rotators.    The femur was machined with a cookie cutter osteotome and then a rasp was used to further lateralize the starting point. The sclerotic bone on the lateral shoulder of the femur was removed with a rongeour and curette as needed to protect the greater trochanter. Progressive broaches were inserted until adequate fill had been achieved. Using fluoroscopy, the femoral stem was visualized after trial reduction of the hip. The length and offset were compared to the non-operative hip. Trials of stem size and neck length were trialed until equal leg length and offset were obtained. Additionally hip stability was tested with internal and external rotation of the leg. The leg was stable with at least 90° of external rotation and there was no  "impingement at 60° of internal rotation. After implantation of the final stem and ball, the leg was once again brought into normal anatomic position and relocated. Final x-rays were taken with final implants noting good position of the stem and cup, and no visualized fracture.. The hip was stable upon reduction.    No Prophylactic Cable: Before final reduction of the hip, the proximal femur and femoral calcar was thoroughly visualized to ensure that there was no fracture/crack. The bone quality was thought to be sufficient enough that no prophylactic cable was needed for this case.    An analgesic cocktail was then injected about the hip as well as the surgical dissection area. The capsule was closed.  The fascia was then closed with a running stitch and the skin was closed in layers.  A sterile dressing was applied.    The patient was moved from the [place] where the [place] were removed. The patient was taken to the recovery room in stable condition. There were no complications and the patient tolerated the procedure well.    R \"Bk\" Sol OSEGUERA MD  Orthopaedic Surgery  [facility]  [phone]              "

## 2022-03-07 NOTE — ANESTHESIA POSTPROCEDURE EVALUATION
Patient: Corinne C Buss    Procedure Summary     Date: 03/07/22 Room / Location: [facility] OR [address] [place] [address] MAIN OR    Anesthesia Start: 0925 Anesthesia Stop: 1044    Procedure: LEFT TOTAL HIP ARTHROPLASTY ANTERIOR WITH HANA TABLE (Left Hip) Diagnosis:     Surgeons: Jasvir Horton II, MD Provider: Delgado Cope MD    Anesthesia Type: general ASA Status: 3          Anesthesia Type: general    Vitals  Vitals Value Taken Time   /61 03/07/22 1251   Temp 36.4 °C (97.6 °F) 03/07/22 1041   Pulse 63 03/07/22 1301   Resp 16 03/07/22 1235   SpO2 100 % 03/07/22 1301   Vitals shown include unvalidated device data.        Post Anesthesia Care and Evaluation    Patient location during evaluation: PACU  Patient participation: complete - patient participated  Level of consciousness: awake and alert  Pain management: adequate  Airway patency: patent  Anesthetic complications: No anesthetic complications    Cardiovascular status: acceptable  Respiratory status: acceptable  Hydration status: acceptable    Comments: --------------------            03/07/22               1235     --------------------   BP:       165/72     Pulse:      62       Resp:       16       Temp:                SpO2:      100%     --------------------

## 2022-03-08 PROBLEM — R50.82 POSTOPERATIVE FEVER: Status: ACTIVE | Noted: 2022-03-08

## 2022-03-08 LAB
ABO GROUP BLD: NORMAL
ANION GAP SERPL CALCULATED.3IONS-SCNC: 13 MMOL/L (ref 5–15)
BLD GP AB SCN SERPL QL: NEGATIVE
BUN SERPL-MCNC: 30 MG/DL (ref 8–23)
BUN/CREAT SERPL: 20.5 (ref 7–25)
CALCIUM SPEC-SCNC: 8.1 MG/DL (ref 8.6–10.5)
CHLORIDE SERPL-SCNC: 105 MMOL/L (ref 98–107)
CO2 SERPL-SCNC: 22 MMOL/L (ref 22–29)
CREAT SERPL-MCNC: 1.46 MG/DL (ref 0.57–1)
DEPRECATED RDW RBC AUTO: 43.6 FL (ref 37–54)
EGFRCR SERPLBLD CKD-EPI 2021: 35.8 ML/MIN/1.73
ERYTHROCYTE [DISTWIDTH] IN BLOOD BY AUTOMATED COUNT: 13.1 % (ref 12.3–15.4)
GLUCOSE BLDC GLUCOMTR-MCNC: 192 MG/DL (ref 70–130)
GLUCOSE BLDC GLUCOMTR-MCNC: 202 MG/DL (ref 70–130)
GLUCOSE BLDC GLUCOMTR-MCNC: 275 MG/DL (ref 70–130)
GLUCOSE SERPL-MCNC: 188 MG/DL (ref 65–99)
HCT VFR BLD AUTO: 18.5 % (ref 34–46.6)
HCT VFR BLD AUTO: 18.7 % (ref 34–46.6)
HCT VFR BLD AUTO: 18.8 % (ref 34–46.6)
HGB BLD-MCNC: 6.1 G/DL (ref 12–15.9)
HGB BLD-MCNC: 6.4 G/DL (ref 12–15.9)
HGB BLD-MCNC: 6.4 G/DL (ref 12–15.9)
MCH RBC QN AUTO: 31.7 PG (ref 26.6–33)
MCHC RBC AUTO-ENTMCNC: 34.2 G/DL (ref 31.5–35.7)
MCV RBC AUTO: 92.6 FL (ref 79–97)
PLATELET # BLD AUTO: 125 10*3/MM3 (ref 140–450)
PMV BLD AUTO: 10.9 FL (ref 6–12)
POTASSIUM SERPL-SCNC: 3.8 MMOL/L (ref 3.5–5.2)
RBC # BLD AUTO: 2.02 10*6/MM3 (ref 3.77–5.28)
RH BLD: POSITIVE
SODIUM SERPL-SCNC: 140 MMOL/L (ref 136–145)
T&S EXPIRATION DATE: NORMAL
WBC NRBC COR # BLD: 7.11 10*3/MM3 (ref 3.4–10.8)

## 2022-03-08 PROCEDURE — 86923 COMPATIBILITY TEST ELECTRIC: CPT

## 2022-03-08 PROCEDURE — 80048 BASIC METABOLIC PNL TOTAL CA: CPT | Performed by: ORTHOPAEDIC SURGERY

## 2022-03-08 PROCEDURE — 85014 HEMATOCRIT: CPT | Performed by: ORTHOPAEDIC SURGERY

## 2022-03-08 PROCEDURE — 36430 TRANSFUSION BLD/BLD COMPNT: CPT

## 2022-03-08 PROCEDURE — 85018 HEMOGLOBIN: CPT | Performed by: ORTHOPAEDIC SURGERY

## 2022-03-08 PROCEDURE — 86900 BLOOD TYPING SEROLOGIC ABO: CPT

## 2022-03-08 PROCEDURE — 86901 BLOOD TYPING SEROLOGIC RH(D): CPT

## 2022-03-08 PROCEDURE — 82962 GLUCOSE BLOOD TEST: CPT

## 2022-03-08 PROCEDURE — 86900 BLOOD TYPING SEROLOGIC ABO: CPT | Performed by: ORTHOPAEDIC SURGERY

## 2022-03-08 PROCEDURE — 63710000001 INSULIN LISPRO (HUMAN) PER 5 UNITS: Performed by: INTERNAL MEDICINE

## 2022-03-08 PROCEDURE — 86850 RBC ANTIBODY SCREEN: CPT | Performed by: ORTHOPAEDIC SURGERY

## 2022-03-08 PROCEDURE — P9016 RBC LEUKOCYTES REDUCED: HCPCS

## 2022-03-08 PROCEDURE — 85027 COMPLETE CBC AUTOMATED: CPT | Performed by: ORTHOPAEDIC SURGERY

## 2022-03-08 PROCEDURE — 25010000002 HYDROMORPHONE 1 MG/ML SOLUTION: Performed by: ORTHOPAEDIC SURGERY

## 2022-03-08 PROCEDURE — 86901 BLOOD TYPING SEROLOGIC RH(D): CPT | Performed by: ORTHOPAEDIC SURGERY

## 2022-03-08 RX ORDER — GLIPIZIDE 5 MG/1
5 TABLET ORAL
Status: DISCONTINUED | OUTPATIENT
Start: 2022-03-08 | End: 2022-03-09

## 2022-03-08 RX ORDER — ASPIRIN 81 MG/1
81 TABLET ORAL EVERY 12 HOURS
Qty: 60 TABLET | Refills: 0 | Status: SHIPPED | OUTPATIENT
Start: 2022-03-08 | End: 2022-04-07

## 2022-03-08 RX ORDER — OXYCODONE HYDROCHLORIDE AND ACETAMINOPHEN 5; 325 MG/1; MG/1
1 TABLET ORAL EVERY 4 HOURS PRN
Qty: 50 TABLET | Refills: 0 | Status: SHIPPED | OUTPATIENT
Start: 2022-03-08

## 2022-03-08 RX ORDER — METFORMIN HYDROCHLORIDE 500 MG/1
1000 TABLET, EXTENDED RELEASE ORAL DAILY
Status: DISCONTINUED | OUTPATIENT
Start: 2022-03-08 | End: 2022-03-08

## 2022-03-08 RX ADMIN — INSULIN LISPRO 4 UNITS: 100 INJECTION, SOLUTION INTRAVENOUS; SUBCUTANEOUS at 22:52

## 2022-03-08 RX ADMIN — OXYCODONE AND ACETAMINOPHEN 1 TABLET: 5; 325 TABLET ORAL at 11:32

## 2022-03-08 RX ADMIN — INSULIN LISPRO 6 UNITS: 100 INJECTION, SOLUTION INTRAVENOUS; SUBCUTANEOUS at 11:32

## 2022-03-08 RX ADMIN — LINAGLIPTIN 5 MG: 5 TABLET, FILM COATED ORAL at 08:34

## 2022-03-08 RX ADMIN — FAMOTIDINE 40 MG: 20 TABLET, FILM COATED ORAL at 08:30

## 2022-03-08 RX ADMIN — INSULIN GLARGINE-YFGN 10 UNITS: 100 INJECTION, SOLUTION SUBCUTANEOUS at 22:46

## 2022-03-08 RX ADMIN — ASPIRIN 81 MG: 81 TABLET, COATED ORAL at 08:30

## 2022-03-08 RX ADMIN — ZOLPIDEM TARTRATE 10 MG: 5 TABLET ORAL at 22:43

## 2022-03-08 RX ADMIN — GLIPIZIDE 5 MG: 5 TABLET ORAL at 08:34

## 2022-03-08 RX ADMIN — MELOXICAM 15 MG: 15 TABLET ORAL at 08:31

## 2022-03-08 RX ADMIN — OXYCODONE AND ACETAMINOPHEN 1 TABLET: 5; 325 TABLET ORAL at 22:44

## 2022-03-08 RX ADMIN — METFORMIN HYDROCHLORIDE 1000 MG: 500 TABLET, EXTENDED RELEASE ORAL at 08:34

## 2022-03-08 RX ADMIN — HYDROMORPHONE HYDROCHLORIDE 1 MG: 1 INJECTION, SOLUTION INTRAMUSCULAR; INTRAVENOUS; SUBCUTANEOUS at 12:58

## 2022-03-08 RX ADMIN — ASPIRIN 81 MG: 81 TABLET, COATED ORAL at 22:43

## 2022-03-08 NOTE — DISCHARGE INSTRUCTIONS
Total Hip  Discharge Instructions  Dr. RANDA Olson” Sol II  [phone]    INCISION CARE  Wash your hands prior to dressing changes  BARBY Wound VAC: Postoperatively you had a BARBY Wound Vac placed on the incision. This was placed under sterile conditions in the operating room. It remains in place for 7 days postoperatively. After 7 days, the entire dressing must be removed, including all of the sticky adhesive. The dressing and battery pack provide gentle suction to the incision and provide several benefits over a traditional dressing:  It maintains the sterile environment of the OR and reduces the risk of infection  The suction removes unwanted buildup of blood/hematoma under the skin to reduce swelling  The suction also promotes fresh blood supply to the skin and soft tissue to speed up healing  The postoperative scar is reduced in size  Showering is permitted immediately after surgery, but the battery pack must be protected or removed during the shower.   After 7 days the BARBY Wound Vac is removed. If there is no drainage, no dressing is required. If there is some scant drainage a dry bandage can be applied and changed daily until seen in the office or until the drainage stops.   No creams or ointments to the incisions until 4 weeks post op.  Do not touch or pick at the incision  Check incision every day and notify surgeon immediately if any of the following signs or symptoms are seen:  Increase in redness  Increase in swelling around the incision and of the entire extremity  Increase in pain  NEW drainage or oozing from the incision  Pulling apart of the edges of the incision  Increase in overall body temperature (greater than 100.4 degrees)  Zip-Line: your incision was closed with a state of the art device.   Is a non-invasive and easy to use wound closure device that replaces sutures, staples and glue for surgical incisions  It minimizes scarring and eliminating “railroad” marks that come with staples or  sutures  It makes removal as atraumatic as peeling off a bandage  Can be removed at home or by a physical therapist or nurse at 14 days postoperatively    ACTIVITIES  Exercises:  Physical therapy will begin immediately while in the hospital. Patients going to a nursing home will get therapy as part of their care at the SNU/SNF facility. Patients going home may also have a therapist come to the house to help them mobilize until they can safely get to an outpatient therapy facility.  Elevate the affected leg most of the day during the first week post operatively. Caution must be taken to avoid pillow placement directly under the heel of the leg, as this can cause pressure ulcers even with a soft pillow. All pillows and blankets should be placed underneath of the thigh and calf so that the heel is free-floating.  Use cold packs for 20-30 minutes approximately 5 times per day.  You should perform the daily stretching and strengthening exercises as taught by the therapist as often as possible. This can be done many times a day.  Full weight bearing is allowed after surgery. It will be sore/painful to put weight on the leg, but this will help the bone to heal and prevent complications such as pneumonia, bed sores and blood clots. Mobilization is vital to the recovery process.  Activities of Daily Living:  No tub baths, hot tubs, or swimming pools for 4 weeks.  May shower and let water run over the incision immediately after surgery. The battery pack of the BARBY Wound Vac must be protected or removed while in the shower. After the BARBY is removed 7 days after surgery showering is permitted as long as there is no drainage from the wound.     Restrictions  Weight: It is ok to allow full weight bearing after surgery. Weight on the leg actually quickens the recovery process. While it will be sore/painful to put weight on the leg, it is safe to do so. Hip replacement after hip fracture has a much slower recover process. It can  take months to heal fully from a hip fracture and patients even make some slow benefits up to a year afterwards.   Driving: Many patients have questions about when it is safe to return to driving. The answer is that this is extremely variable. It depends on the extent of the surgery, as well as how quickly you heal. Certainly left leg surgeries make returning to driving easier while right leg surgeries require more extensive rehabilitation before driving can be safe. Until you can press down on the brake hard, and are off of all narcotics, driving is not permitted. Your surgeon cannot “clear” you to return to driving, only you can make the decision when you feel it is safe.    Medications  Anticoagulants: After upper extremity surgery most patients do not require an anticoagulant unless you have another injury that will be keeping you from mobilizing. Lower extremity surgery typically does require use of an anticoagulation medicine.   IF YOU HAD LOWER EXTREMITY SURGERY AND ARE NOT DISCHARGED HOME WITH ANY ANTICOAGULANT MEDICINE YOU SHOULD TAKE ASPIRIN 325mg DAILY FOR 30 DAYS POSTOPERATIVELY.  If you are discharged home with an anticoagulant such as Aspirin, Xarelto, Eliquis, Coumadin, or Lovenox, follow these simple instructions:   Notify surgeon immediately if any trey bleeding is noted in the urine, stool, emesis, or from the nose or the incision. Blood in the stool will often appear as black rather than red. Blood in urine may appear as pink. Blood in emesis may appear as brown/black like coffee grounds.  You will need to apply pressure for longer periods of time to any cuts or abrasions to stop bleeding  Avoid alcohol while taking anticoagulants  Most anticoagulants are to be taken for 30 days postoperatively. After this time, you may stop using them unless instructed otherwise.   If you were already taking an anticoagulant (commonly Aspirin, Coumadin, or Plavix) you will likely be resuming your normal dose  postoperatively and will be continuing that medication at the discretion of the prescribing physician.  Stool Softeners: You will be at greater risk of constipation after surgery due to being less mobile and the pain medications.  Take stool softeners as needed. Over the counter Colace 100 mg 1-2 capsules twice daily can be taken.  If stools become too loose or too frequent, please decreases the dosage or stop the stool softener.  If constipation occurs despite use of stool softeners, you are to continue the stool softeners and add a laxative (Milk of Magnesia 1 ounce daily as needed)  Drink plenty of fluids, and eat fruits and vegetables during your recovery time. Getting up and mobilizing will help the bowels to recover their regular function, as will weaning off of all narcotics when the pain becomes tolerable.  Pain Medications: Utilized after surgery are narcotics. This is some general information about these medications.  CLASSIFICATION: Pain medications are called Opioids and are narcotics  LEGALITIES: It is illegal to share narcotics with others  DRIVING: it is illegal to drive while under the influence of narcotics. Doing so is a DUI.  POTENTIAL SIDE EFFECTS: nausea, vomiting, itching, dizziness, drowsiness, dry mouth, constipation, and difficulty urinating.  POTENTIAL ADVERSE EFFECTS:  Opioid tolerance can develop with use of pain medications and this simply means that it requires more and more of the medication to control pain. However, this is seen more in patients that use opioids for longer periods of time.  Opioid dependence can develop with use of Opioids. People with opioid dependence will experience withdrawal symptoms upon cessation of the medication.  Opioid addiction can develop with use of Opioids. The incidence of this is very unlikely in patients who take the medications as ordered and stop the medications as instructed.  Opioid overdose can be dangerous, but is unlikely when the medication  is taken as ordered and stopped when ordered. It is important not to mix opioids with alcohol as this can lead to over sedation and respiratory difficulty.  DOSAGE:  After the initial surgical pain begins to resolve, you may begin to decrease the pain medication. By the end of a few weeks, you should be off of pain medications.  Refills will not be given by the office during evening hours, on weekends, or after 6 weeks post-op. You are responsible for weaning off of pain medication. You can increase the time between narcotic pills, taking one every 4 then 6 then 8 hours and so on.  To seek refills on pain medications during the initial 6-week post-operative period, you must call the office to request the refill. The office will then notify you when to  the prescription. DO NOT wait until you are out of the medication to request a refill. Prescriptions will not be filled over the weekend and depending on the schedule, it may take a couple days for the prescription to be available. Someone will have to pick the prescription in person at the office.    FOLLOW-UP VISITS  You will need to follow up in the office with your surgeon in 3 weeks, or as instructed elsewhere in your discharge paperwork. Please call this number [phone] to schedule this appointment. If you are going to an SNF/SNU facility, they will arrange for you to follow up in the office.  If you have any concerns or suspected complications prior to your follow up visit, please call the office. Do not wait until your appointment time if you suspect complications. These will need to be addressed in the office promptly.      Jasvir Horton II, MD  Orthopaedic Surgery  [facility]

## 2022-03-08 NOTE — PLAN OF CARE
Problem: Adult Inpatient Plan of Care  Goal: Absence of Hospital-Acquired Illness or Injury  Intervention: Identify and Manage Fall Risk  Recent Flowsheet Documentation  Taken 3/8/2022 0711 by Lora Beasley RN  Safety Promotion/Fall Prevention:   safety round/check completed   fall prevention program maintained  Taken 3/7/2022 1800 by Lora Beasley RN  Safety Promotion/Fall Prevention:   safety round/check completed   fall prevention program maintained  Intervention: Prevent Skin Injury  Recent Flowsheet Documentation  Taken 3/8/2022 0711 by Lora Beasley RN  Body Position: supine, legs elevated  Skin Protection:   silicone foam dressing in place   transparent dressing maintained   tubing/devices free from skin contact  Taken 3/7/2022 1800 by Lora Beasley RN  Body Position: supine, legs elevated  Intervention: Prevent and Manage VTE (venous thromboembolism) Risk  Recent Flowsheet Documentation  Taken 3/8/2022 0711 by Lora Beasley RN  VTE Prevention/Management:   bilateral   sequential compression devices on     Problem: Adult Inpatient Plan of Care  Goal: Absence of Hospital-Acquired Illness or Injury  Intervention: Prevent Skin Injury  Recent Flowsheet Documentation  Taken 3/8/2022 0711 by Lora Beasley RN  Body Position: supine, legs elevated  Skin Protection:   silicone foam dressing in place   transparent dressing maintained   tubing/devices free from skin contact  Taken 3/7/2022 1800 by Lora Beasley RN  Body Position: supine, legs elevated   Goal Outcome Evaluation:

## 2022-03-08 NOTE — PLAN OF CARE
"Goal Outcome Evaluation:              Outcome Evaluation: Pt is POD#1 L MEG. Pt exhibited some confusion overnight yelling for \"Don\" her  and had to be redirected several times this shift. Pain managed with PRN medication x1. BARBY dressing C/D/I with green light flashing \"OK\". Pt up to BSC x2 with minimal output.  Lab reported critical lab of 6.4 hgb and 18.8 hct. Dayshift made aware. D/C plans pending, WCTM.  "

## 2022-03-08 NOTE — PROGRESS NOTES
Name: Corinne C Buss ADMIT: 3/7/2022   : 1940  PCP: Shawn Perdue MD    MRN: [id number] LOS: 0 days   AGE/SEX: 81 y.o. female  ROOM: [facility]     Subjective   Subjective   Referring Provider: Dr. Bk Horton  Reason for Follow-up: HTN, Type 2 DM  No acute events. Patient feels generally weak and light-headed. She had a fever this AM. Denies CP/dyspnea/chills/n/v/d/abdominal pain.    Objective   Objective   Vital Signs  Temp:  [97.9 °F (36.6 °C)-101.2 °F (38.4 °C)] 100.8 °F (38.2 °C)  Heart Rate:  [67-81] 81  Resp:  [18-20] 18  BP: ()/(51-61) 101/51  SpO2:  [95 %-97 %] 96 %  on   ;   Device (Oxygen Therapy): room air  Body mass index is 22.39 kg/m².  Physical Exam  Vitals and nursing note reviewed.   Constitutional:       General: She is not in acute distress.     Appearance: She is not toxic-appearing or diaphoretic.   HENT:      Head: Normocephalic and atraumatic.      Nose: Nose normal.      Mouth/Throat:      Mouth: Mucous membranes are moist.      Pharynx: Oropharynx is clear.   Eyes:      Extraocular Movements: Extraocular movements intact.      Conjunctiva/sclera: Conjunctivae normal.      Pupils: Pupils are equal, round, and reactive to light.   Cardiovascular:      Rate and Rhythm: Normal rate and regular rhythm.      Pulses: Normal pulses.   Pulmonary:      Effort: Pulmonary effort is normal.      Breath sounds: Normal breath sounds.   Abdominal:      General: Bowel sounds are normal.      Palpations: Abdomen is soft.   Musculoskeletal:         General: Tenderness (left hip) present. No swelling.      Cervical back: Normal range of motion and neck supple.   Skin:     General: Skin is warm and dry.      Capillary Refill: Capillary refill takes less than 2 seconds.   Neurological:      General: No focal deficit present.      Mental Status: She is alert and oriented to person, place, and time.   Psychiatric:         Mood and Affect: Mood normal.         Behavior: Behavior normal.     Results  Review     I reviewed the patient's new clinical results.  I reviewed the patient's xray of the pelvis  Results from last 7 days   Lab Units 03/08/22  0910 03/08/22  0502 03/04/22  1217   WBC 10*3/mm3  --  7.11 5.49   HEMOGLOBIN g/dL 6.1* 6.4*  6.4* 11.5*   PLATELETS 10*3/mm3  --  125* 185     Results from last 7 days   Lab Units 03/08/22  0502 03/04/22  1217   SODIUM mmol/L 140 141   POTASSIUM mmol/L 3.8 3.9   CHLORIDE mmol/L 105 104   CO2 mmol/L 22.0 25.0   BUN mg/dL 30* 29*   CREATININE mg/dL 1.46* 1.22*   GLUCOSE mg/dL 188* 135*     Results from last 7 days   Lab Units 03/04/22  1217   ALBUMIN g/dL 4.60   BILIRUBIN mg/dL 0.3   ALK PHOS U/L 64   AST (SGOT) U/L 19   ALT (SGPT) U/L 17     Results from last 7 days   Lab Units 03/08/22  0502 03/04/22  1217   CALCIUM mg/dL 8.1* 10.4   ALBUMIN g/dL  --  4.60       COVID19   Date Value Ref Range Status   03/04/2022 Not Detected Not Detected - Ref. Range Final     Glucose   Date/Time Value Ref Range Status   03/08/2022 1049 275 (H) 70 - 130 mg/dL Final     Comment:     Meter: [id number] : [id number] Pollock Landy NA   03/07/2022 2138 212 (H) 70 - 130 mg/dL Final     Comment:     Meter: [id number] : [id number] Kenneth SHAHA   03/07/2022 1846 207 (H) 70 - 130 mg/dL Final     Comment:     Meter: [id number] : [id number] Balbir Hart NA   03/07/2022 1055 193 (H) 70 - 130 mg/dL Final     Comment:     Meter: [id number] : [id number] Martinez Garnica RN   03/07/2022 0701 154 (H) 70 - 130 mg/dL Final     Comment:     Meter: [id number] : [id number] Stu LOPEZ       XR Pelvis 1 or 2 View  Narrative: PORTABLE JOINT X-RAY     HISTORY: Postop left hip arthroplasty.     Portable x-ray of the pelvis is provided.     FINDINGS:  There is arthroplasty hardware, positioned as expected.  No  periprosthetic fracture is identified.  There are expected post  operative   changes in the soft tissues.     Impression: Left hip arthroplasty as expected.     This  report was finalized on 3/7/2022 11:17 AM by Dr. Rj Reynolds M.D.     XR Hip With or Without Pelvis 1 View Left  Narrative: LEFT HIP OPERATIVE X-RAY     HISTORY: Hip pain, arthroplasty.     TECHNIQUE: 13 seconds fluoroscopy was provided operatively for Dr. Bk Horton during hip arthroplasty. Two fluoroscopic images were saved and  show arthroplasty hardware as expected.     Impression: Operative imaging was provided for Dr. Horton during left hip  arthroplasty.     This report was finalized on 3/7/2022 11:01 AM by Dr. Rj Reynolds M.D.     FL C Arm During Surgery  This procedure was auto-finalized with no dictation required.    Scheduled Medications  aspirin, 81 mg, Oral, Q12H  famotidine, 40 mg, Oral, Daily  glipizide, 5 mg, Oral, Daily With Breakfast  insulin glargine, 10 Units, Subcutaneous, Nightly  insulin lispro, 0-9 Units, Subcutaneous, TID AC  linagliptin, 5 mg, Oral, Daily  meloxicam, 15 mg, Oral, Daily  metoprolol succinate XL, 100 mg, Oral, Daily  triamterene-hydrochlorothiazide, 1 tablet, Oral, Daily  zolpidem, 10 mg, Oral, Nightly    Infusions  lactated ringers, 9 mL/hr, Last Rate: 9 mL/hr (03/07/22 0930)  sodium chloride, 100 mL/hr, Last Rate: 100 mL/hr (03/08/22 0247)    Diet  Diet Regular        Assessment/Plan     Active Hospital Problems    Diagnosis  POA   • **Hip joint replacement status [Z96.649]  Not Applicable   • Postoperative fever [R50.82]  No   • Type 2 diabetes mellitus with kidney complication, without long-term current use of insulin (HCC) [E11.29]  Yes   • Hypertension [I10]  Yes   • Stage 3a chronic kidney disease (HCC) [N18.31]  Yes      Resolved Hospital Problems   No resolved problems to display.   Left Hip Osteoarthritis  - s/p left MEG 3/7/22  - postoperative management per primary team    Postoperative Fever  - no evidence of infection-it would be a little soon after surgery for this anyway  - monitor temperature curve and give tylenol PRN    Type 2 DM  - complications  include CKD  - stop metformin  - continue glipizide and tradjenta  - continue ssi/hypoglycmeia protocol  - start on lantus 10 units nightly while she is here    Postoperative Anemia due to Acute Blood loss   - she does have some anemia at baseline  - 1 unit of PRBCs ordered, awaiting this from blood bank but I will increase this to 2 units given her degree of anemia and symptoms  - follow H&H and transfuse as needed    Stage 3a CKD  - cr increased a bit today, will hold maxide and continue IVF  - monitor BMP    TCP  - mild, likely consumptive   - follow up CBC in AM    ASA 81mg Q12H per primary team for DVT prophylaxis.  Discussed with patient and nursing staff.  Disposition per Primary Team      Fletcher Mora MD  [facility]  03/08/22  15:23 EST

## 2022-03-08 NOTE — CASE MANAGEMENT/SOCIAL WORK
Discharge Planning Assessment  [facility]     Patient Name: Corinne C Buss  MRN: [id number]  Today's Date: 3/8/2022    Admit Date: 3/7/2022     Discharge Needs Assessment    No documentation.                Discharge Plan     Row Name 03/08/22 1556       Plan    Plan Home with family support & [facility].    Patient/Family in Agreement with Plan yes    Plan Comments Spoke with the patinet, verified current information and explained the role of the CCP. Patient said she lives with her /Don and has family support. She plans to d/c home with family support and HH. Careplan received from [facility] which plans for the patient to d/c home with family support & [facility]. Discussed with the patient who's agreeable. Referral sent/accepted  PTA. CCP will follow for additional d/c needs.              Continued Care and Services - Admitted Since 3/7/2022     Home Medical Care Coordination complete.    Service Provider Request Status Selected Services Address Phone Fax Patient Preferred    [facility] [address] [place] [phone] --              Expected Discharge Date and Time     Expected Discharge Date Expected Discharge Time    Mar 9, 2022         Talya Bunch RN

## 2022-03-08 NOTE — PROGRESS NOTES
Patient extremely anemic this morning.  She is a little anemic at baseline.  Some of this is blood loss and other is delusional.  Regardless, hemoglobin low enough that I have ordered 1 unit of PRBCs.  Plan home discharge tomorrow as long she does well

## 2022-03-09 ENCOUNTER — READMISSION MANAGEMENT (OUTPATIENT)
Dept: CALL CENTER | Facility: HOSPITAL | Age: 82
End: 2022-03-09

## 2022-03-09 VITALS
HEART RATE: 84 BPM | OXYGEN SATURATION: 95 % | HEIGHT: 61 IN | WEIGHT: 118.5 LBS | TEMPERATURE: 97.8 F | SYSTOLIC BLOOD PRESSURE: 138 MMHG | BODY MASS INDEX: 22.37 KG/M2 | DIASTOLIC BLOOD PRESSURE: 67 MMHG | RESPIRATION RATE: 16 BRPM

## 2022-03-09 LAB
ANION GAP SERPL CALCULATED.3IONS-SCNC: 11 MMOL/L (ref 5–15)
BASOPHILS # BLD AUTO: 0.05 10*3/MM3 (ref 0–0.2)
BASOPHILS NFR BLD AUTO: 0.7 % (ref 0–1.5)
BH BB BLOOD EXPIRATION DATE: NORMAL
BH BB BLOOD EXPIRATION DATE: NORMAL
BH BB BLOOD TYPE BARCODE: 5100
BH BB BLOOD TYPE BARCODE: 5100
BH BB DISPENSE STATUS: NORMAL
BH BB DISPENSE STATUS: NORMAL
BH BB PRODUCT CODE: NORMAL
BH BB PRODUCT CODE: NORMAL
BH BB UNIT NUMBER: NORMAL
BH BB UNIT NUMBER: NORMAL
BUN SERPL-MCNC: 23 MG/DL (ref 8–23)
BUN/CREAT SERPL: 19 (ref 7–25)
CALCIUM SPEC-SCNC: 8.9 MG/DL (ref 8.6–10.5)
CHLORIDE SERPL-SCNC: 103 MMOL/L (ref 98–107)
CO2 SERPL-SCNC: 25 MMOL/L (ref 22–29)
CREAT SERPL-MCNC: 1.21 MG/DL (ref 0.57–1)
CROSSMATCH INTERPRETATION: NORMAL
CROSSMATCH INTERPRETATION: NORMAL
DEPRECATED RDW RBC AUTO: 48 FL (ref 37–54)
EGFRCR SERPLBLD CKD-EPI 2021: 44.8 ML/MIN/1.73
EOSINOPHIL # BLD AUTO: 0.22 10*3/MM3 (ref 0–0.4)
EOSINOPHIL NFR BLD AUTO: 3 % (ref 0.3–6.2)
ERYTHROCYTE [DISTWIDTH] IN BLOOD BY AUTOMATED COUNT: 14.2 % (ref 12.3–15.4)
GLUCOSE BLDC GLUCOMTR-MCNC: 148 MG/DL (ref 70–130)
GLUCOSE BLDC GLUCOMTR-MCNC: 236 MG/DL (ref 70–130)
GLUCOSE BLDC GLUCOMTR-MCNC: 250 MG/DL (ref 70–130)
GLUCOSE SERPL-MCNC: 150 MG/DL (ref 65–99)
HCT VFR BLD AUTO: 29.9 % (ref 34–46.6)
HGB BLD-MCNC: 10.2 G/DL (ref 12–15.9)
IMM GRANULOCYTES # BLD AUTO: 0.04 10*3/MM3 (ref 0–0.05)
IMM GRANULOCYTES NFR BLD AUTO: 0.5 % (ref 0–0.5)
LYMPHOCYTES # BLD AUTO: 1.5 10*3/MM3 (ref 0.7–3.1)
LYMPHOCYTES NFR BLD AUTO: 20.6 % (ref 19.6–45.3)
MCH RBC QN AUTO: 31.4 PG (ref 26.6–33)
MCHC RBC AUTO-ENTMCNC: 34.1 G/DL (ref 31.5–35.7)
MCV RBC AUTO: 92 FL (ref 79–97)
MONOCYTES # BLD AUTO: 0.75 10*3/MM3 (ref 0.1–0.9)
MONOCYTES NFR BLD AUTO: 10.3 % (ref 5–12)
NEUTROPHILS NFR BLD AUTO: 4.72 10*3/MM3 (ref 1.7–7)
NEUTROPHILS NFR BLD AUTO: 64.9 % (ref 42.7–76)
NRBC BLD AUTO-RTO: 0 /100 WBC (ref 0–0.2)
PLATELET # BLD AUTO: 105 10*3/MM3 (ref 140–450)
PMV BLD AUTO: 10.3 FL (ref 6–12)
POTASSIUM SERPL-SCNC: 3.8 MMOL/L (ref 3.5–5.2)
RBC # BLD AUTO: 3.25 10*6/MM3 (ref 3.77–5.28)
SODIUM SERPL-SCNC: 139 MMOL/L (ref 136–145)
UNIT  ABO: NORMAL
UNIT  ABO: NORMAL
UNIT  RH: NORMAL
UNIT  RH: NORMAL
WBC NRBC COR # BLD: 7.28 10*3/MM3 (ref 3.4–10.8)

## 2022-03-09 PROCEDURE — 97530 THERAPEUTIC ACTIVITIES: CPT

## 2022-03-09 PROCEDURE — 82962 GLUCOSE BLOOD TEST: CPT

## 2022-03-09 PROCEDURE — 80048 BASIC METABOLIC PNL TOTAL CA: CPT | Performed by: INTERNAL MEDICINE

## 2022-03-09 PROCEDURE — 63710000001 INSULIN LISPRO (HUMAN) PER 5 UNITS: Performed by: INTERNAL MEDICINE

## 2022-03-09 PROCEDURE — 85025 COMPLETE CBC W/AUTO DIFF WBC: CPT | Performed by: INTERNAL MEDICINE

## 2022-03-09 RX ORDER — GLIPIZIDE 5 MG/1
5 TABLET ORAL
Status: DISCONTINUED | OUTPATIENT
Start: 2022-03-09 | End: 2022-03-09 | Stop reason: HOSPADM

## 2022-03-09 RX ADMIN — GLIPIZIDE 5 MG: 5 TABLET ORAL at 08:53

## 2022-03-09 RX ADMIN — ASPIRIN 81 MG: 81 TABLET, COATED ORAL at 08:54

## 2022-03-09 RX ADMIN — OXYCODONE AND ACETAMINOPHEN 2 TABLET: 5; 325 TABLET ORAL at 17:24

## 2022-03-09 RX ADMIN — METOPROLOL SUCCINATE 100 MG: 100 TABLET, EXTENDED RELEASE ORAL at 08:53

## 2022-03-09 RX ADMIN — LINAGLIPTIN 5 MG: 5 TABLET, FILM COATED ORAL at 08:53

## 2022-03-09 RX ADMIN — INSULIN LISPRO 6 UNITS: 100 INJECTION, SOLUTION INTRAVENOUS; SUBCUTANEOUS at 12:12

## 2022-03-09 RX ADMIN — OXYCODONE AND ACETAMINOPHEN 2 TABLET: 5; 325 TABLET ORAL at 08:54

## 2022-03-09 RX ADMIN — MELOXICAM 15 MG: 15 TABLET ORAL at 08:53

## 2022-03-09 RX ADMIN — INSULIN LISPRO 4 UNITS: 100 INJECTION, SOLUTION INTRAVENOUS; SUBCUTANEOUS at 17:08

## 2022-03-09 RX ADMIN — FAMOTIDINE 40 MG: 20 TABLET, FILM COATED ORAL at 08:54

## 2022-03-09 NOTE — PLAN OF CARE
Goal Outcome Evaluation:  Plan of Care Reviewed With: patient        Progress: improving  Outcome Evaluation: Pt alert and agreeable to PT. Pt sat up to EOB req SV. Pt stood req SV and use of fww. Pt then amb 400' req SV and use of fww. Pt educ for and asc/dsc 2 stairs w/ 1 HR since pt uses door frame for support to enter home. No overt safety issues noted during performance of stairs or gait. Pt plans to return home w/ assist of spouse and HH PT.    Patient was intermittently wearing a face mask during this therapy encounter. Therapist used appropriate personal protective equipment including eye protection, mask, and gloves.  Mask used was standard procedure mask. Appropriate PPE was worn during the entire therapy session. Hand hygiene was completed before and after therapy session. Patient is not in enhanced droplet precautions.

## 2022-03-09 NOTE — CASE MANAGEMENT/SOCIAL WORK
Continued Stay Note  [facility]     Patient Name: Corinne C Buss  MRN: [id number]  Today's Date: 3/9/2022    Admit Date: 3/7/2022     Discharge Plan     Row Name 03/09/22 1448       Plan    Plan Home with family support & [facility].    Patient/Family in Agreement with Plan yes    Plan Comments Spoke with both the patient and her /Don who plan for the patient to d/c home with family support & [facility]. They deny needs for RH at this time. Don plans to transport the patient home at d/c. No other needs identified. CCP following.               Discharge Codes    No documentation.               Expected Discharge Date and Time     Expected Discharge Date Expected Discharge Time    Mar 9, 2022             Talya Bunch RN

## 2022-03-09 NOTE — PROGRESS NOTES
"/63 (BP Location: Left arm, Patient Position: Lying)   Pulse 81   Temp 97 °F (36.1 °C) (Oral)   Resp 16   Ht 154.9 cm (61\")   Wt 53.8 kg (118 lb 8 oz)   SpO2 94%   BMI 22.39 kg/m²     Results from last 7 days   Lab Units 03/09/22  0522   WBC 10*3/mm3 7.28   HEMOGLOBIN g/dL 10.2*   HEMATOCRIT % 29.9*   PLATELETS 10*3/mm3 105*       Results from last 7 days   Lab Units 03/09/22  0522   SODIUM mmol/L 139   POTASSIUM mmol/L 3.8   CHLORIDE mmol/L 103   CO2 mmol/L 25.0   BUN mg/dL 23   CREATININE mg/dL 1.21*   GLUCOSE mg/dL 150*   CALCIUM mg/dL 8.9       Imaging Results (Last 24 Hours)     ** No results found for the last 24 hours. **          Patient Care Team:  Shawn Perdue MD as PCP - General (Family Medicine)    SUBJECTIVE  Doing better.  Hb up  PHYSICAL EXAM  BARBY intact     Hip joint replacement status    Type 2 diabetes mellitus with kidney complication, without long-term current use of insulin (HCC)    Hypertension    Stage 3a chronic kidney disease (HCC)    Postoperative fever      PLAN / DISPOSITION:  D/C with SANTOS Lamar  03/09/22  07:53 EST    "

## 2022-03-09 NOTE — PROGRESS NOTES
Name: Corinne C Buss ADMIT: 3/7/2022   : 1940  PCP: Shawn Perdue MD    MRN: [id number] LOS: 1 days   AGE/SEX: 81 y.o. female  ROOM: [facility]/     Subjective   Subjective   Referring Provider: Dr. Bk Horton  Reason for Follow-up: HTN, Type 2 DM   No acute events. Patient feels much better-her weakness and light-headedness have resolved following blood transfusion. No more fevers. Denies CP/dyspnea/chills/n/v/d/abdominal pain.    Objective   Objective   Vital Signs  Temp:  [97 °F (36.1 °C)-99.8 °F (37.7 °C)] 97.8 °F (36.6 °C)  Heart Rate:  [76-85] 84  Resp:  [16-18] 16  BP: (108-150)/(56-70) 138/67  SpO2:  [92 %-95 %] 95 %  on  Flow (L/min):  [1] 1;   Device (Oxygen Therapy): room air  Body mass index is 22.39 kg/m².  Physical Exam  Vitals and nursing note reviewed.   Constitutional:       General: She is not in acute distress.     Appearance: She is not toxic-appearing or diaphoretic.   HENT:      Head: Normocephalic and atraumatic.      Nose: Nose normal.      Mouth/Throat:      Mouth: Mucous membranes are moist.      Pharynx: Oropharynx is clear.   Eyes:      Extraocular Movements: Extraocular movements intact.      Conjunctiva/sclera: Conjunctivae normal.      Pupils: Pupils are equal, round, and reactive to light.   Cardiovascular:      Rate and Rhythm: Normal rate and regular rhythm.      Pulses: Normal pulses.   Pulmonary:      Effort: Pulmonary effort is normal.      Breath sounds: Normal breath sounds.   Abdominal:      General: Bowel sounds are normal.      Palpations: Abdomen is soft.   Musculoskeletal:         General: Tenderness (left hip) present. No swelling.      Cervical back: Normal range of motion and neck supple.   Skin:     General: Skin is warm and dry.      Capillary Refill: Capillary refill takes less than 2 seconds.   Neurological:      General: No focal deficit present.      Mental Status: She is alert and oriented to person, place, and time.   Psychiatric:         Mood and  Affect: Mood normal.         Behavior: Behavior normal.     Results Review     I reviewed the patient's new clinical results.  I reviewed the patient's xray of the pelvis  Results from last 7 days   Lab Units 03/09/22  0522 03/08/22  0910 03/08/22  0502 03/04/22  1217   WBC 10*3/mm3 7.28  --  7.11 5.49   HEMOGLOBIN g/dL 10.2* 6.1* 6.4*  6.4* 11.5*   PLATELETS 10*3/mm3 105*  --  125* 185     Results from last 7 days   Lab Units 03/09/22  0522 03/08/22  0502 03/04/22  1217   SODIUM mmol/L 139 140 141   POTASSIUM mmol/L 3.8 3.8 3.9   CHLORIDE mmol/L 103 105 104   CO2 mmol/L 25.0 22.0 25.0   BUN mg/dL 23 30* 29*   CREATININE mg/dL 1.21* 1.46* 1.22*   GLUCOSE mg/dL 150* 188* 135*     Results from last 7 days   Lab Units 03/04/22  1217   ALBUMIN g/dL 4.60   BILIRUBIN mg/dL 0.3   ALK PHOS U/L 64   AST (SGOT) U/L 19   ALT (SGPT) U/L 17     Results from last 7 days   Lab Units 03/09/22  0522 03/08/22  0502 03/04/22  1217   CALCIUM mg/dL 8.9 8.1* 10.4   ALBUMIN g/dL  --   --  4.60       COVID19   Date Value Ref Range Status   03/04/2022 Not Detected Not Detected - Ref. Range Final     Glucose   Date/Time Value Ref Range Status   03/09/2022 1624 236 (H) 70 - 130 mg/dL Final     Comment:     Meter: [id number] : [id number] Phill Bill NA   03/09/2022 1040 250 (H) 70 - 130 mg/dL Final     Comment:     Meter: [id number] : [id number] Pollockli Bill NA   03/09/2022 0556 148 (H) 70 - 130 mg/dL Final     Comment:     Meter: [id number] : [id number] Stu Arias NA   03/08/2022 2101 202 (H) 70 - 130 mg/dL Final     Comment:     Meter: [id number] : [id number] Zach LOPEZ   03/08/2022 1627 192 (H) 70 - 130 mg/dL Final     Comment:     Meter: [id number] : [id number] Phill LOPEZ   03/08/2022 1049 275 (H) 70 - 130 mg/dL Final     Comment:     Meter: [id number] : [id number] Phill LOPEZ   03/07/2022 2138 212 (H) 70 - 130 mg/dL Final     Comment:     Meter: [id number] : [id number] Kenneth  Clay CNA       XR Pelvis 1 or 2 View  Narrative: PORTABLE JOINT X-RAY     HISTORY: Postop left hip arthroplasty.     Portable x-ray of the pelvis is provided.     FINDINGS:  There is arthroplasty hardware, positioned as expected.  No  periprosthetic fracture is identified.  There are expected post  operative   changes in the soft tissues.     Impression: Left hip arthroplasty as expected.     This report was finalized on 3/7/2022 11:17 AM by Dr. Rj Reynolds M.D.     XR Hip With or Without Pelvis 1 View Left  Narrative: LEFT HIP OPERATIVE X-RAY     HISTORY: Hip pain, arthroplasty.     TECHNIQUE: 13 seconds fluoroscopy was provided operatively for Dr. Bk Horton during hip arthroplasty. Two fluoroscopic images were saved and  show arthroplasty hardware as expected.     Impression: Operative imaging was provided for Dr. Horton during left hip  arthroplasty.     This report was finalized on 3/7/2022 11:01 AM by Dr. Rj Reynolds M.D.     FL C Arm During Surgery  This procedure was auto-finalized with no dictation required.    Scheduled Medications  aspirin, 81 mg, Oral, Q12H  famotidine, 40 mg, Oral, Daily  glipizide, 5 mg, Oral, Daily With Breakfast  insulin glargine, 10 Units, Subcutaneous, Nightly  insulin lispro, 0-9 Units, Subcutaneous, TID AC  linagliptin, 5 mg, Oral, Daily  meloxicam, 15 mg, Oral, Daily  metoprolol succinate XL, 100 mg, Oral, Daily  zolpidem, 10 mg, Oral, Nightly    Infusions  lactated ringers, 9 mL/hr, Last Rate: 9 mL/hr (03/07/22 0930)  sodium chloride, 100 mL/hr, Last Rate: 100 mL/hr (03/08/22 0247)    Diet  Diet Regular        Assessment/Plan     Active Hospital Problems    Diagnosis  POA   • **Hip joint replacement status [Z96.649]  Not Applicable   • Postoperative fever [R50.82]  No   • Type 2 diabetes mellitus with kidney complication, without long-term current use of insulin (HCC) [E11.29]  Yes   • Hypertension [I10]  Yes   • Stage 3a chronic kidney disease (HCC) [N18.31]  Yes       Resolved Hospital Problems   No resolved problems to display.   Left Hip Osteoarthritis  - s/p left MEG 3/7/22  - postoperative management per primary team    Postoperative Fever  - no evidence of infection  - this has resovled    Type 2 DM  - complications include CKD  - stop metformin-can resume janumet at discharge  - continue glipizide and tradjenta but will increase the former to BID AC-she can resume her usual regimen at discharge  - continue ssi/hypoglycmeia protocol  - continue on lantus 10 units nightly while she is here    Postoperative Anemia due to Acute Blood loss   - she does have some anemia at baseline  - s/p 2 units of PRBCs with good response  - follow H&H and transfuse as needed    Stage 3a CKD  - cr improved and is near baseline  - hold maxzide    TCP  - mild, likely consumptive       ASA 81mg Q12H per primary team for DVT prophylaxis.  Discussed with patient, family and nursing staff.  Disposition per Primary Team. Okay to discharge at any time from medicine standpoint. She should keep PCP follow up in about a week to check on renal function and anemia/TCP.       Fletcher Mora MD  [facility]  03/09/22  16:41 EST

## 2022-03-09 NOTE — DISCHARGE SUMMARY
Discharge Summary   Jasvir Horton M.D.    NAME: Corinne C Buss ADMIT: 3/7/2022   : 1940  PCP: Shawn Perdue MD    MRN: [id number] LOS: 1 days   AGE/SEX: 81 y.o. female  ROOM:        Date of Discharge: 3/9/22    Primary Discharge Diagnosis:  Hip joint replacement status [Z96.649]    Secondary Discharge Diagnosis:    Problems Addressed this Visit    None     Diagnoses    None.         Procedures Performed:  Left Total Hip Arthroplasty      Hospital Course:    Corinne C Buss is a 81 y.o.  female who underwent successful Left Total Hip Arthroplasty on 3/7/2022.  Corinne C Buss was started on Aspirin 325mg po daily immediately post-operatively for DVT prophylaxis.  On post-op day 1 the patients dressing was changed and their incision was clean, with no signs of infection and their calf was soft, with no signs of DVT.  The patient progressed well with physical therapy and the patients hemoglobin remained stable. On post-operative day 2 the patient was felt ready for discharge.      Total Hip Replacement Discharge Instructions:    I. ACTIVITIES:    1. Exercises:  ? Complete exercise program as taught by the hospital physical therapist 2 times per day  ? Exercise program will be advanced by the physical therapist  ? During the day be up ambulating every 2 hours (while awake) for short distances  ? Complete the ankle pump exercises at least 10 times per hour (while awake)  ? Elevate legs when in bed and for at least 30 minutes during the day.Use cold packs 20-30 minutes approximately 5 times per day. This should be done before and after completing your exercises and at any time you are experiencing pain/ stiffness in your operative extremity.    2. Activities of Daily Living:  ? No tub baths, hot tubs, or swimming pools for 4 weeks  ? May shower and let water run over the incision on post-operative day #5 if no drainage. Do not scrub or rub the incision. Simply let the water run over the incision and  pat dry.    II. Precautions:  ? Everyone that comes near you should wash their hands  ? No elective dental, genital-urinary, or colon procedures or surgical procedures for 12 weeks after surgery unless absolutely necessary.  ? If dental work or surgical procedure is deemed absolutely necessary during the first 12 weeks, you will need to contact your surgeon as you will need to take antibiotics 1 hour prior to any dental work (including teeth cleanings).Please discuss with your surgeon prophylactic antibiotics as the length of time this intervention will be necessary for you varies with each patient’s health history and situation.  ? Avoid sick people. If you must be around someone who is ill, they should wear a mask.  ? Avoid visits to the Emergency Room or Urgent Care unless you are having a life threatening event.   ? If ordered stockings are to be placed on in the morning and removed at night. Monitor the stockings to ensure that any swelling is not causing the stockings to become too tight. In this case, remove stockings immediately.    III. INCISION CARE:    ? Wash your hands prior to dressing changes  ? Change the dressing as needed to keep incision clean and dry. Utilize dry gauze and paper tape. Avoid touching the side of the gauze that goes against the incision with your hands.  ? No creams or ointments to the incision  ? May remove dressing once the incision is free of drainage  ? Do not touch or pick at the incision  ? Check incision every day and notify surgeon immediately if any of the following signs or symptoms are noted:  o Increase in redness  o Increase in swelling around the incision and of the entire extremity  o Increase in pain  o Drainage oozing from the incision  o Pulling apart of the edges of the incision  o Increase in overall body temperature (greater than 100.5 degrees)  ? Your surgeon will instruct you regarding suture or staple removal    IV. Medications:     1. Anticoagulants: You  will be discharged on an anticoagulant. This is a prophylactic medication that helps prevent blood clots during your post-operative period. The type and length of dosage varies based on your individual needs, procedure performed, and surgeon’s preference.  ? While taking the anticoagulant, you should avoid taking any additional aspirin, ibuprofen (Advil or Motrin), Aleve (Naprosyn) or other non-steroidal anti-inflammatory medications.   ? Notify surgeon immediately if any trey bleeding is noted in the urine, stool, emesis, or from the nose or the incision. Blood in the stool will often appear as black rather than red. Blood in urine may appear as pink. Blood in emesis may appear as brown/black like coffee grounds.  ? You will need to apply pressure for longer periods of time to any cuts or abrasions to stop bleeding  ? Avoid alcohol while taking anticoagulants    2. Stool Softeners: You will be at greater risk of constipation after surgery due to being less mobile and the pain medications.   ? Take stool softeners as instructed by your surgeon while on pain medications. Bran cereal is most effective. Over the counter Colace 100 mg 1-2 capsules twice daily.   ? If stools become too loose or too frequent, please decreases the dosage or stop the stool softener.  ? If constipation occurs despite use of stool softeners, you are to continue the stool softeners and add a laxative (Milk of Magnesia 1 ounce daily as needed)  ? Drink plenty of fluids, and eat fruits and vegetables during your recovery time    3. Pain Medications utilized after surgery are narcotics and the law requires that the following information be given to all patients that are prescribed narcotics:  ? CLASSIFICATION: Pain medications are called Opioids and are narcotics  ? LEGALITIES: It is illegal to share narcotics with others and to drive within 24 hours of taking narcotics  ? POTENTIAL SIDE EFFECTS: Potential side effects of opioids include:  nausea, vomiting, itching, dizziness, drowsiness, dry mouth, constipation, and difficulty urinating.  ? POTENTIAL ADVERSE EFFECTS:   o Opioid tolerance can develop with use of pain medications and this simply means that it requires more and more of the medication to control pain; however, this is seen more in patients that use opioids for longer periods of time.  o Opioid dependence can develop with use of Opioids and this simply means that to stop the medication can cause withdrawal symptoms; however, this is seen with patients that use Opioids for longer periods of time.  o Opioid addiction can develop with use of Opioids and the incidence of this is very unlikely in patients who take the medications as ordered and stop the medications as instructed.  o Opioid overdose can be dangerous, but is unlikely when the medication is taken as ordered and stopped when ordered. It is important not to mix opioids with alcohol or with and type of sedative such as Benadryl as this can lead to over sedation and respiratory difficulty.  ? DOSAGE:   o Pain medications will need to be taken consistently for the first week to decrease pain and promote adequate pain relief and participation in physical therapy.  o After the initial surgical pain begins to resolve, you may begin to decrease the pain medication. By the end of 6-8 weeks, you should be off of pain medications.  o Refills will not be given by the office during evening hours, on weekends, or after 6-8 weeks post-op.  o To seek refills on pain medications during the initial 6 week post-operative period, you must call the office 48 hours in advance to request the refill. The office will then notify you when to  the prescription. DO NOT wait until you are out of the medication to request a refill.    V. FOLLOW-UP VISITS:  ? You will need to follow up in the office with your surgeon in 3 weeks. Please call this number [phone]  to schedule this appointment.  If you  have any concerns or suspected complications prior to your follow up visit, please call your surgeons office. Do not wait until your appointment time if you suspect complications. These will need to be addressed in the office promptly.    Discharge Medications:    1)  Percocet 5 mg 1-2 po q 4-6 hours prn pain  2)  Enteric Coated Aspirin 81 mg po daily for 6 weeks.      SANTOS Kraus  3/9/2022  07:54 EST

## 2022-03-09 NOTE — THERAPY TREATMENT NOTE
"Patient Name: Corinne C Buss  : 1940    MRN: [id number]                              Today's Date: 3/9/2022       Admit Date: 3/7/2022    Visit Dx: No diagnosis found.  Patient Active Problem List   Diagnosis   • Hip joint replacement status   • Type 2 diabetes mellitus with kidney complication, without long-term current use of insulin (HCC)   • Hypertension   • Stage 3a chronic kidney disease (HCC)   • Postoperative fever     Past Medical History:   Diagnosis Date   • Arthritis    • Diabetes mellitus (HCC)    • GERD (gastroesophageal reflux disease)    • Hyperlipidemia    • Hypertension    • Left hip pain      Past Surgical History:   Procedure Laterality Date   • BREAST SURGERY      LUMPECTOMY   • COLONOSCOPY     • HYSTERECTOMY      \"MEDAL BLADDER PIECE\"   • TOTAL HIP ARTHROPLASTY Left 3/7/2022    Procedure: LEFT TOTAL HIP ARTHROPLASTY ANTERIOR WITH HANA TABLE;  Surgeon: Jasvir Horton II, MD;  Location: [facility];  Service: Orthopedics;  Laterality: Left;      General Information     Row Name 22 1558          Physical Therapy Time and Intention    Document Type therapy note (daily note)  -     Mode of Treatment physical therapy  -     Row Name 22 1558          General Information    Existing Precautions/Restrictions fall;hip;left;hip, anterior  -PH     Row Name 22 1558          Safety Issues, Functional Mobility    Impairments Affecting Function (Mobility) endurance/activity tolerance;balance;strength;range of motion (ROM)  -PH           User Key  (r) = Recorded By, (t) = Taken By, (c) = Cosigned By    Initials Name Provider Type    PH Mami Alberts PTA Physical Therapy Assistant               Mobility     Row Name 22 1559          Bed Mobility    Bed Mobility supine-sit  -PH     Supine-Sit [place] (Bed Mobility) supervision  -PH     Assistive Device (Bed Mobility) bed rails;head of bed elevated  -PH     Comment, (Bed Mobility) Pt req SBA at EOB  " -PH     Row Name 03/09/22 1559          Sit-Stand Transfer    Sit-Stand [place] (Transfers) contact guard;supervision  -PH     Assistive Device (Sit-Stand Transfers) walker, front-wheeled  -PH     Comment, (Sit-Stand Transfer) steady w/ no LOB  -PH     Row Name 03/09/22 1559          Gait/Stairs (Locomotion)    [place] Level (Gait) contact guard;standby assist  -PH     Assistive Device (Gait) walker, front-wheeled  -PH     Distance in Feet (Gait) 400  -PH     Deviations/Abnormal Patterns (Gait) jose armando decreased;gait speed decreased  -PH     Left Sided Gait Deviations weight shift ability decreased  -PH     [place] Level (Stairs) contact guard;verbal cues  -PH     Handrail Location (Stairs) right side (ascending);right side (descending)  -PH     Number of Steps (Stairs) 2  -PH     Ascending Technique (Stairs) step-to-step  -PH     Descending Technique (Stairs) step-to-step  -PH     Comment, (Gait/Stairs) pt has no HR although uses door frame to enter home; pt educ for asc/dsc stairs w/ 1 HR  -PH     Row Name 03/09/22 1559          Mobility    Extremity Weight-bearing Status left lower extremity  -PH     Left Lower Extremity (Weight-bearing Status) weight-bearing as tolerated (WBAT)  -PH           User Key  (r) = Recorded By, (t) = Taken By, (c) = Cosigned By    Initials Name Provider Type     Mami Alberts PTA Physical Therapy Assistant               Obj/Interventions     Row Name 03/09/22 1600          Motor Skills    Therapeutic Exercise other (see comments)  L MEG protocol x 10 reps  -PH           User Key  (r) = Recorded By, (t) = Taken By, (c) = Cosigned By    Initials Name Provider Type     Mami Alberts PTA Physical Therapy Assistant               Goals/Plan    No documentation.                Clinical Impression     Row Name 03/09/22 1601          Pain    Pain Intervention(s) Repositioned;Ambulation/increased activity  -PH     Row Name 03/09/22 1601          Plan of  Care Review    Plan of Care Reviewed With patient  -PH     Progress improving  -PH     Outcome Evaluation Pt alert and agreeable to PT. Pt sat up to EOB req SV. Pt stood req SV and use of fww. Pt then amb 400' req SV and use of fww. Pt educ for and asc/dsc 2 stairs w/ 1 HR since pt uses door frame for support to enter home. No overt safety issues noted during performance of stairs or gait. Pt plans to return home w/ assist of spouse and HH PT.  -PH     Row Name 03/09/22 1601          Positioning and Restraints    Pre-Treatment Position in bed  -PH     Post Treatment Position chair  -PH     In Chair reclined;call light within reach;encouraged to call for assist;exit alarm on;with family/caregiver  -PH           User Key  (r) = Recorded By, (t) = Taken By, (c) = Cosigned By    Initials Name Provider Type    PH Mami Alberts PTA Physical Therapy Assistant               Outcome Measures     Row Name 03/09/22 1603          How much help from another person do you currently need...    Turning from your back to your side while in flat bed without using bedrails? 4  -PH     Moving from lying on back to sitting on the side of a flat bed without bedrails? 3  -PH     Moving to and from a bed to a chair (including a wheelchair)? 4  -PH     Standing up from a chair using your arms (e.g., wheelchair, bedside chair)? 4  -PH     Climbing 3-5 steps with a railing? 3  -PH     To walk in hospital room? 3  -PH     AM-PAC 6 Clicks Score (PT) 21  -PH     Row Name 03/09/22 1603          Functional Assessment    Outcome Measure Options AM-PAC 6 Clicks Basic Mobility (PT)  -PH           User Key  (r) = Recorded By, (t) = Taken By, (c) = Cosigned By    Initials Name Provider Type    PH Mami Alberts PTA Physical Therapy Assistant                             Physical Therapy Education                 Title: PT OT SLP Therapies (Done)     Topic: Physical Therapy (Done)     Point: Mobility training (Done)     Learning  Progress Summary           Patient Acceptance, E,D, VU by  at 3/9/2022 1603    Acceptance, E,TB,D, VU,NR by  at 3/7/2022 1652                   Point: Home exercise program (Done)     Learning Progress Summary           Patient Acceptance, E,D, VU by  at 3/9/2022 1603    Acceptance, E,TB,D, VU,NR by  at 3/7/2022 1652                   Point: Body mechanics (Done)     Learning Progress Summary           Patient Acceptance, E,D, VU by  at 3/9/2022 1603    Acceptance, E,TB,D, VU,NR by  at 3/7/2022 1652                   Point: Precautions (Done)     Learning Progress Summary           Patient Acceptance, E,D, VU by  at 3/9/2022 1603    Acceptance, E,TB,D, VU,NR by  at 3/7/2022 1652                               User Key     Initials Effective Dates Name Provider Type [facility] 06/16/21 -  Mami Alberts PTA Physical Therapy Assistant PT     05/10/21 -  Lora Montague Physical Therapist PT              PT Recommendation and Plan     Plan of Care Reviewed With: patient  Progress: improving  Outcome Evaluation: Pt alert and agreeable to PT. Pt sat up to EOB req SV. Pt stood req SV and use of fww. Pt then amb 400' req SV and use of fww. Pt educ for and asc/dsc 2 stairs w/ 1 HR since pt uses door frame for support to enter home. No overt safety issues noted during performance of stairs or gait. Pt plans to return home w/ assist of spouse and  PT.     Time Calculation:    PT Charges     Row Name 03/09/22 1604             Time Calculation    Start Time 1316  -PH      Stop Time 1333  -PH      Time Calculation (min) 17 min  -PH      PT Received On 03/09/22  -PH      PT - Next Appointment 03/10/22  -PH              Timed Charges    56655 - PT Therapeutic Activity Minutes 11  -PH              Total Minutes    Timed Charges Total Minutes 11  -PH       Total Minutes 11  -PH            User Key  (r) = Recorded By, (t) = Taken By, (c) = Cosigned By    Initials Name Provider Type     Aristeo  CRISTOBAL Bolaños Physical Therapy Assistant              Therapy Charges for Today     Code Description Service Date Service Provider Modifiers Qty    [id number] HC PT THERAPEUTIC ACT EA 15 MIN 3/9/2022 Mami Alberts PTA GP 1          PT G-Codes  Outcome Measure Options: AM-PAC 6 Clicks Basic Mobility (PT)  AM-PAC 6 Clicks Score (PT): 21    Mami Alberts PTA  3/9/2022

## 2022-03-09 NOTE — PLAN OF CARE
Goal Outcome Evaluation:  Plan of Care Reviewed With: patient, spouse        Progress: improving  Outcome Evaluation: Pt is POD #2. L total hip. pt had some confusion tonight and was redirected. Pain managed with PRN PO meds. BARBY dressing c/d/i with green flashing light. weight bearing as tolerated with BSC assist x 1. Hgb 6.4 and 2 units of blood given today. room air. d/c home with home health. WCTM

## 2022-03-10 NOTE — CASE MANAGEMENT/SOCIAL WORK
Case Management Discharge Note      Final Note: Home with [facility].         Selected Continued Care - Discharged on 3/9/2022 Admission date: 3/7/2022 - Discharge disposition: Home or Self Care    Destination    No services have been selected for the patient.              Durable Medical Equipment    No services have been selected for the patient.              Dialysis/Infusion    No services have been selected for the patient.              Home Medical Care Coordination complete.    Service Provider Selected Services Address Phone Fax Patient Preferred    [facility] - Located within [facility] [address] [place] [id number] [phone] --          Therapy    No services have been selected for the patient.              Community Resources    No services have been selected for the patient.              Community & DME    No services have been selected for the patient.                  Transportation Services  Private: Car    Final Discharge Disposition Code: 06 - home with home health care

## 2022-03-10 NOTE — OUTREACH NOTE
Prep Survey    Flowsheet Row Responses   [facility] facility patient discharged from? [place]   Is LACE score < 7 ? No   Emergency Room discharge w/ pulse ox? No   Eligibility Readm Mgmt   Discharge diagnosis Hip joint replacement status   Does the patient have one of the following disease processes/diagnoses(primary or secondary)? Total Joint Replacement   Does the patient have Home health ordered? Yes   What is the Home health agency?  [facility]   Is there a DME ordered? No   Comments regarding appointments Follow up with Shawn Perdue MD   Prep survey completed? Yes          HARLEEN BRICENO - Registered Nurse

## 2022-03-14 ENCOUNTER — READMISSION MANAGEMENT (OUTPATIENT)
Dept: CALL CENTER | Facility: HOSPITAL | Age: 82
End: 2022-03-14

## 2022-03-14 ENCOUNTER — TELEPHONE (OUTPATIENT)
Dept: ORTHOPEDIC SURGERY | Facility: HOSPITAL | Age: 82
End: 2022-03-14

## 2022-03-14 NOTE — OUTREACH NOTE
Total Joint Week 1 Survey    Flowsheet Row Responses   [facility] [place] patient discharged from? [place]   Does the patient have one of the following disease processes/diagnoses(primary or secondary)? Total Joint Replacement   Joint surgery performed? Hip   Week 1 attempt successful? Yes   Call start time 1659   Call end time 1708   Discharge diagnosis Hip joint replacement status   Does the patient have all medications related to this admission filled (includes all antibiotics, pain medications, etc.) Yes   Is the patient taking all medications as directed (includes completed medication regime)? Yes   Is the patient able to teach back alternate methods of pain control? Ice, Knee-elevation/no pillow under knee, Reposition, Correct alignment, Short, frequent activity   Does the patient have a follow up appointment with their surgeon? Yes   Has the patient kept scheduled appointments due by today? No  [plans to schedule soon]   Nursing Interventions Advised to reschedule appointment   What is the Home health agency?  [facility]   Has home health visited the patient within 72 hours of discharge? Yes   Psychosocial issues? No   Has the patient began therapy sessions (either in the home or as an out patient)? Yes   If the patient has started attending therapy, what post op day did they begin to attend (either in home or as an out patient)?   home PT   Does the patient have a wound vac in place? No   Date wound vac should be removed 03/14/22  [removed today]   Has the patient fallen since discharge? No   Did the patient receive a copy of their discharge instructions? Yes   Nursing interventions Reviewed instructions with patient   What is the patient's perception of their functional status since discharge? Improving   Is the patient able to teach back signs and symptoms of infection? Temp >100.4 for 24h or longer, Incisional drainage, Blisters around incision, Increased swelling or redness around  incision (not associated with surgical edema), Severe discomfort or pain, Changes in mobility, Shortness of breath or chest pain   Is the patient able to teach back how to prevent infection? Check incision daily, Keep incision covered if drainage, Wash hands before and after touching incision, Keep incision covered if pets in house, Monitor blood sugar if diabetic, No tub baths, hot tub or swimming, Shower only as directed by surgeon, Eat well-balanced diet, No lotion or creams   Is the patient able to teach back signs and symptoms of DVT? Redness in calf, Area hot to touch, Shortness of breath or chest pain, Severe pain in calf, Swelling in calf   Is the patient able to teach back home safety measures? Ability to shower, Accessibility to necessary areas in home, Modifications to reach items, Modifications with ADLs such as dressing, cooking, toileting   Did the patient implement home safety suggestions from pre-surgery classes if attended? N/A   If the patient is a current smoker, are they able to teach back resources for cessation? Not a smoker   Is the patient/caregiver able to teach back the hierarchy of who to call/visit for symptoms/problems? PCP, Specialist, Home health nurse, Urgent Care, ED, 911 Yes   Week 1 call completed? Yes          TRU WILKS - Registered Nurse

## 2022-03-14 NOTE — TELEPHONE ENCOUNTER
Called and spoke with Ms. Jewell to see how she is doing as she is 1 week SP [facility]. She said she is getting by, but she is having a lot of swelling to her left leg. Advised her to make sure to keep her leg elevated and use ice. She said she has been doing that. In speaking with her she said the swelling was a little bit better this morning but then she got up and made the bed and took a shower and now she has more swelling. Told her small amounts of activity but frequently. It could be that she is just doing too much at once. She denies calf pain/tenderness. She said around the incision looks ok, but she does have some blood on the dressing. At her description it seems like it could be old. She is taking the pain medication and it does help.  She was worried about her PT. [facility] and they have come to see her once. They haven't been back or made contact since Friday, and she said the PT told her they were going to come once a week. She doesn't feel that is going to be enough. Told her I would reach out to MD to make sure that he doesn't want her to have limited therapy. She said she isn't able to get in or out of the bed by herself. She can't lift her leg, and feels she would benefit from more therapy. Told her I would let MD know.   She doesn't have any other questions/concerns for me at this time. Ms. Jewell was given my contact information should she need anything.

## 2022-03-22 ENCOUNTER — READMISSION MANAGEMENT (OUTPATIENT)
Dept: CALL CENTER | Facility: HOSPITAL | Age: 82
End: 2022-03-22

## 2022-03-22 NOTE — OUTREACH NOTE
Total Joint Week 2 Survey    Flowsheet Row Responses   [facility] patient discharged from? [place]   Does the patient have one of the following disease processes/diagnoses(primary or secondary)? Total Joint Replacement   Joint surgery performed? Hip   Week 2 attempt successful? Yes   Call start time 1906   Call end time 1907   Has the patient been back in either the hospital or Emergency Department since discharge? No   Discharge diagnosis Hip joint replacement status   Is the patient taking all medications as directed (includes completed medication regime)? Yes   Comments regarding appointments will see Dr. Horton on 3/30   Has the patient kept scheduled appointments due by today? Yes   What is the Home health agency?  [facility]   Psychosocial issues? No   Has the patient began therapy sessions (either in the home or as an out patient)? Yes   If the patient has started attending therapy, what post op day did they begin to attend (either in home or as an out patient)?   home health PT is still coming   Has the patient fallen since discharge? No   What is the patient's perception of their functional status since discharge? Improving   Is the patient able to teach back how to prevent infection? Check incision daily   Is the patient/caregiver able to teach back the hierarchy of who to call/visit for symptoms/problems? PCP, Specialist, Home health nurse, Urgent Care, ED, 911 Yes   Week 2 call completed? Yes   Wrap up additional comments Doing well, no questions at this time.          CASA VÁZQUEZ - Registered Nurse

## 2022-04-07 ENCOUNTER — READMISSION MANAGEMENT (OUTPATIENT)
Dept: CALL CENTER | Facility: HOSPITAL | Age: 82
End: 2022-04-07

## 2022-04-07 NOTE — OUTREACH NOTE
Total Joint Month 1 Survey    Flowsheet Row Responses   [facility] patient discharged from? [place]   Does the patient have one of the following disease processes/diagnoses(primary or secondary)? Total Joint Replacement   Joint surgery performed? Hip   Month 1 attempt successful? Yes   Call start time 0902   Call end time 0909   Discharge diagnosis Hip joint replacement status   Is the patient taking all medications as directed (includes completed medication regime)? Yes   Has the patient kept scheduled appointments due by today? Yes   Is the patient still receiving Home Health Services? No   Is the patient still attending therapy sessions(either in the home or as an outpatient)? No   Has the patient fallen since discharge? No   Comments Does not therapy but may go on her own, She is doing her own exercises at home.    What is the patient's perception of their functional status since discharge? Improving   Is the patient/caregiver able to teach back the hierarchy of who to call/visit for symptoms/problems? PCP, Specialist, Home health nurse, Urgent Care, ED, 911 Yes   Additional teach back comments She will get a cane in about a week. She will take antibx before dental procedures.    Month 1 call completed? Yes   Revoked No further contact(revokes)-requires comment   Is the patient interested in additional calls from an ambulatory ?  NOTE:  applies to high risk patients requiring additional follow-up. No   Graduated/Revoked comments Doing great, mobility is good and she is keeping all of her f/u appts.           MARGY DUKES - Registered Nurse

## (undated) DEVICE — SUT ETHIB 2 CV V37 MS/4 30IN MX69G

## (undated) DEVICE — NEEDLE, QUINCKE, 20GX3.5": Brand: MEDLINE

## (undated) DEVICE — SUT VIC 0 CT1 36IN J946H

## (undated) DEVICE — MAT FLR ABSORBENT LG 4FT 10 2.5FT

## (undated) DEVICE — SOL ISO/ALC RUB 70PCT 4OZ

## (undated) DEVICE — TBG PENCL TELESCP MEGADYNE SMOKE EVAC 10FT

## (undated) DEVICE — S/M FLEXIBLE ALEXIS ORTHOPAEDIC PROTECTOR: Brand: ALEXIS® ORTHOPAEDIC PROTECTOR

## (undated) DEVICE — 450 ML BOTTLE OF 0.05% CHLORHEXIDINE GLUCONATE IN 99.95% STERILE WATER FOR IRRIGATION, USP AND APPLICATOR.: Brand: IRRISEPT ANTIMICROBIAL WOUND LAVAGE

## (undated) DEVICE — DRAPE,REIN 53X77,STERILE: Brand: MEDLINE

## (undated) DEVICE — PK ANT HIP 40

## (undated) DEVICE — RECIPROCATING BLADE HEAVY DUTY LONG, OFFSET  (77.6 X 0.77 X 11.2MM)

## (undated) DEVICE — SOL NACL 0.9PCT 100ML SGL

## (undated) DEVICE — GLV SURG PREMIERPRO ORTHO LTX PF SZ8.5 BRN

## (undated) DEVICE — GLV SURG SENSICARE PI MIC PF SZ7 LF STRL

## (undated) DEVICE — APPL CHLORAPREP HI/LITE 26ML ORNG

## (undated) DEVICE — GLV SURG SIGNATURE ESSENTIAL PF LTX SZ8.5

## (undated) DEVICE — 3M™ IOBAN™ 2 ANTIMICROBIAL INCISE DRAPE 6640EZ: Brand: IOBAN™ 2

## (undated) DEVICE — TRAP FLD MINIVAC MEGADYNE 100ML

## (undated) DEVICE — GLV SURG BIOGEL LTX PF 7

## (undated) DEVICE — ZIP 16 SURGICAL SKIN CLOSURE DEVICE, PSA: Brand: ZIP 16 SURGICAL SKIN CLOSURE DEVICE